# Patient Record
Sex: FEMALE | Race: WHITE | NOT HISPANIC OR LATINO | Employment: UNEMPLOYED | URBAN - NONMETROPOLITAN AREA
[De-identification: names, ages, dates, MRNs, and addresses within clinical notes are randomized per-mention and may not be internally consistent; named-entity substitution may affect disease eponyms.]

---
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
## 2023-04-03 ENCOUNTER — TELEPHONE (OUTPATIENT)
Dept: OBSTETRICS AND GYNECOLOGY | Facility: CLINIC | Age: 18
End: 2023-04-03
Payer: MEDICAID

## 2023-04-03 NOTE — TELEPHONE ENCOUNTER
Pt called in stating that she just moved back to [place].She is needing to get in to see someone ASAP. She is due April 11th and the soonest opening I seen was the 10th. She was also wondering if there was need for a ultra sound.

## 2023-04-04 ENCOUNTER — ANESTHESIA (OUTPATIENT)
Dept: LABOR AND DELIVERY | Facility: HOSPITAL | Age: 18
End: 2023-04-04
Payer: MEDICAID

## 2023-04-04 ENCOUNTER — ANESTHESIA EVENT (OUTPATIENT)
Dept: LABOR AND DELIVERY | Facility: HOSPITAL | Age: 18
End: 2023-04-04
Payer: MEDICAID

## 2023-04-04 ENCOUNTER — HOSPITAL ENCOUNTER (INPATIENT)
Facility: HOSPITAL | Age: 18
LOS: 3 days | Discharge: HOME | End: 2023-04-07
Attending: OBSTETRICS & GYNECOLOGY | Admitting: OBSTETRICS & GYNECOLOGY
Payer: MEDICAID

## 2023-04-04 ENCOUNTER — INITIAL PRENATAL (OUTPATIENT)
Dept: OBSTETRICS AND GYNECOLOGY | Facility: CLINIC | Age: 18
End: 2023-04-04
Payer: MEDICAID

## 2023-04-04 VITALS
SYSTOLIC BLOOD PRESSURE: 122 MMHG | WEIGHT: 218 LBS | DIASTOLIC BLOOD PRESSURE: 80 MMHG | HEIGHT: 66 IN | BODY MASS INDEX: 35.03 KG/M2

## 2023-04-04 DIAGNOSIS — O36.63X0 EXCESSIVE FETAL GROWTH AFFECTING MANAGEMENT OF PREGNANCY IN THIRD TRIMESTER, SINGLE OR UNSPECIFIED FETUS: ICD-10-CM

## 2023-04-04 DIAGNOSIS — Z3A.39 39 WEEKS GESTATION OF PREGNANCY: Primary | ICD-10-CM

## 2023-04-04 DIAGNOSIS — O28.3 ABNORMAL OBSTETRIC ULTRASOUND SCAN: ICD-10-CM

## 2023-04-04 LAB
ABO GROUP BLD: NORMAL
AMPHET+METHAMPHET UR QL: NEGATIVE
AMPHETAMINES UR QL: NEGATIVE
BARBITURATES UR QL SCN: NEGATIVE
BASOPHILS # BLD AUTO: 0.03 10*3/MM3 (ref 0–0.2)
BASOPHILS NFR BLD AUTO: 0.2 % (ref 0–1.5)
BENZODIAZ UR QL SCN: NEGATIVE
BLD GP AB SCN SERPL QL: NEGATIVE
BUPRENORPHINE SERPL-MCNC: NEGATIVE NG/ML
CANNABINOIDS SERPL QL: NEGATIVE
COCAINE UR QL: NEGATIVE
DEPRECATED RDW RBC AUTO: 51.7 FL (ref 37–54)
EOSINOPHIL # BLD AUTO: 0.08 10*3/MM3 (ref 0–0.4)
EOSINOPHIL NFR BLD AUTO: 0.5 % (ref 0.3–6.2)
ERYTHROCYTE [DISTWIDTH] IN BLOOD BY AUTOMATED COUNT: 16.7 % (ref 12.3–15.4)
GLUCOSE UR STRIP-MCNC: NEGATIVE MG/DL
HBV SURFACE AG SERPL QL IA: NORMAL
HCT VFR BLD AUTO: 30.1 % (ref 34–46.6)
HCV AB SER DONR QL: NORMAL
HGB BLD-MCNC: 8.9 G/DL (ref 12–15.9)
HIV1+2 AB SER QL: NORMAL
IMM GRANULOCYTES # BLD AUTO: 0.07 10*3/MM3 (ref 0–0.05)
IMM GRANULOCYTES NFR BLD AUTO: 0.5 % (ref 0–0.5)
LYMPHOCYTES # BLD AUTO: 3.6 10*3/MM3 (ref 0.7–3.1)
LYMPHOCYTES NFR BLD AUTO: 24.3 % (ref 19.6–45.3)
MCH RBC QN AUTO: 25.4 PG (ref 26.6–33)
MCHC RBC AUTO-ENTMCNC: 29.6 G/DL (ref 31.5–35.7)
MCV RBC AUTO: 86 FL (ref 79–97)
METHADONE UR QL SCN: NEGATIVE
MONOCYTES # BLD AUTO: 0.93 10*3/MM3 (ref 0.1–0.9)
MONOCYTES NFR BLD AUTO: 6.3 % (ref 5–12)
NEUTROPHILS NFR BLD AUTO: 10.09 10*3/MM3 (ref 1.7–7)
NEUTROPHILS NFR BLD AUTO: 68.2 % (ref 42.7–76)
NRBC BLD AUTO-RTO: 0 /100 WBC (ref 0–0.2)
OPIATES UR QL: NEGATIVE
OXYCODONE UR QL SCN: NEGATIVE
PCP UR QL SCN: NEGATIVE
PLATELET # BLD AUTO: 242 10*3/MM3 (ref 140–450)
PMV BLD AUTO: 12.5 FL (ref 6–12)
PROPOXYPH UR QL: NEGATIVE
PROT UR STRIP-MCNC: ABNORMAL MG/DL
RBC # BLD AUTO: 3.5 10*6/MM3 (ref 3.77–5.28)
RH BLD: POSITIVE
RPR SER QL: NORMAL
T&S EXPIRATION DATE: NORMAL
TRICYCLICS UR QL SCN: NEGATIVE
WBC NRBC COR # BLD: 14.8 10*3/MM3 (ref 3.4–10.8)

## 2023-04-04 PROCEDURE — 80081 OBSTETRIC PANEL INC HIV TSTG: CPT | Performed by: OBSTETRICS & GYNECOLOGY

## 2023-04-04 PROCEDURE — 3E033VJ INTRODUCTION OF OTHER HORMONE INTO PERIPHERAL VEIN, PERCUTANEOUS APPROACH: ICD-10-PCS | Performed by: OBSTETRICS & GYNECOLOGY

## 2023-04-04 PROCEDURE — 99204 OFFICE O/P NEW MOD 45 MIN: CPT | Performed by: OBSTETRICS & GYNECOLOGY

## 2023-04-04 PROCEDURE — 25010000002 PENICILLIN G POTASSIUM PER 600000 UNITS: Performed by: OBSTETRICS & GYNECOLOGY

## 2023-04-04 PROCEDURE — 25010000002 BUTORPHANOL PER 1 MG: Performed by: OBSTETRICS & GYNECOLOGY

## 2023-04-04 PROCEDURE — C1755 CATHETER, INTRASPINAL: HCPCS | Performed by: NURSE ANESTHETIST, CERTIFIED REGISTERED

## 2023-04-04 PROCEDURE — 86803 HEPATITIS C AB TEST: CPT | Performed by: OBSTETRICS & GYNECOLOGY

## 2023-04-04 PROCEDURE — 10907ZC DRAINAGE OF AMNIOTIC FLUID, THERAPEUTIC FROM PRODUCTS OF CONCEPTION, VIA NATURAL OR ARTIFICIAL OPENING: ICD-10-PCS | Performed by: OBSTETRICS & GYNECOLOGY

## 2023-04-04 PROCEDURE — 25010000002 ROPIVACAINE PER 1 MG: Performed by: NURSE ANESTHETIST, CERTIFIED REGISTERED

## 2023-04-04 PROCEDURE — 80306 DRUG TEST PRSMV INSTRMNT: CPT | Performed by: OBSTETRICS & GYNECOLOGY

## 2023-04-04 RX ORDER — SODIUM CHLORIDE 0.9 % (FLUSH) 0.9 %
10 SYRINGE (ML) INJECTION EVERY 12 HOURS SCHEDULED
Status: DISCONTINUED | OUTPATIENT
Start: 2023-04-04 | End: 2023-04-05 | Stop reason: HOSPADM

## 2023-04-04 RX ORDER — BUTORPHANOL TARTRATE 1 MG/ML
2 INJECTION, SOLUTION INTRAMUSCULAR; INTRAVENOUS
Status: DISCONTINUED | OUTPATIENT
Start: 2023-04-04 | End: 2023-04-05 | Stop reason: HOSPADM

## 2023-04-04 RX ORDER — HETASTARCH 6 G/100ML
500 INJECTION, SOLUTION INTRAVENOUS ONCE AS NEEDED
Status: DISCONTINUED | OUTPATIENT
Start: 2023-04-04 | End: 2023-04-05 | Stop reason: HOSPADM

## 2023-04-04 RX ORDER — MISOPROSTOL 200 UG/1
800 TABLET ORAL ONCE AS NEEDED
Status: DISCONTINUED | OUTPATIENT
Start: 2023-04-04 | End: 2023-04-05 | Stop reason: HOSPADM

## 2023-04-04 RX ORDER — TRISODIUM CITRATE DIHYDRATE AND CITRIC ACID MONOHYDRATE 500; 334 MG/5ML; MG/5ML
30 SOLUTION ORAL ONCE AS NEEDED
Status: DISCONTINUED | OUTPATIENT
Start: 2023-04-04 | End: 2023-04-05 | Stop reason: HOSPADM

## 2023-04-04 RX ORDER — MISOPROSTOL 200 UG/1
800 TABLET ORAL ONCE
Status: DISCONTINUED | OUTPATIENT
Start: 2023-04-04 | End: 2023-04-04

## 2023-04-04 RX ORDER — TRISODIUM CITRATE DIHYDRATE AND CITRIC ACID MONOHYDRATE 500; 334 MG/5ML; MG/5ML
30 SOLUTION ORAL ONCE
Status: DISCONTINUED | OUTPATIENT
Start: 2023-04-04 | End: 2023-04-05 | Stop reason: HOSPADM

## 2023-04-04 RX ORDER — OXYTOCIN/0.9 % SODIUM CHLORIDE 30/500 ML
999 PLASTIC BAG, INJECTION (ML) INTRAVENOUS ONCE
Status: DISCONTINUED | OUTPATIENT
Start: 2023-04-04 | End: 2023-04-04

## 2023-04-04 RX ORDER — OXYTOCIN/0.9 % SODIUM CHLORIDE 30/500 ML
2-30 PLASTIC BAG, INJECTION (ML) INTRAVENOUS
Status: DISCONTINUED | OUTPATIENT
Start: 2023-04-04 | End: 2023-04-05

## 2023-04-04 RX ORDER — ONDANSETRON 4 MG/1
4 TABLET, FILM COATED ORAL EVERY 6 HOURS PRN
Status: DISCONTINUED | OUTPATIENT
Start: 2023-04-04 | End: 2023-04-05 | Stop reason: HOSPADM

## 2023-04-04 RX ORDER — CARBOPROST TROMETHAMINE 250 UG/ML
250 INJECTION, SOLUTION INTRAMUSCULAR AS NEEDED
Status: DISCONTINUED | OUTPATIENT
Start: 2023-04-04 | End: 2023-04-05 | Stop reason: HOSPADM

## 2023-04-04 RX ORDER — METHYLERGONOVINE MALEATE 0.2 MG/ML
200 INJECTION INTRAVENOUS ONCE AS NEEDED
Status: DISCONTINUED | OUTPATIENT
Start: 2023-04-04 | End: 2023-04-05 | Stop reason: HOSPADM

## 2023-04-04 RX ORDER — ONDANSETRON 2 MG/ML
4 INJECTION INTRAMUSCULAR; INTRAVENOUS EVERY 6 HOURS PRN
Status: DISCONTINUED | OUTPATIENT
Start: 2023-04-04 | End: 2023-04-05 | Stop reason: HOSPADM

## 2023-04-04 RX ORDER — OXYTOCIN/0.9 % SODIUM CHLORIDE 30/500 ML
999 PLASTIC BAG, INJECTION (ML) INTRAVENOUS ONCE AS NEEDED
Status: DISCONTINUED | OUTPATIENT
Start: 2023-04-04 | End: 2023-04-05 | Stop reason: HOSPADM

## 2023-04-04 RX ORDER — BUTORPHANOL TARTRATE 1 MG/ML
1 INJECTION, SOLUTION INTRAMUSCULAR; INTRAVENOUS
Status: DISCONTINUED | OUTPATIENT
Start: 2023-04-04 | End: 2023-04-05 | Stop reason: HOSPADM

## 2023-04-04 RX ORDER — FAMOTIDINE 10 MG/ML
20 INJECTION, SOLUTION INTRAVENOUS ONCE AS NEEDED
OUTPATIENT
Start: 2023-04-04

## 2023-04-04 RX ORDER — OXYTOCIN/0.9 % SODIUM CHLORIDE 30/500 ML
250 PLASTIC BAG, INJECTION (ML) INTRAVENOUS CONTINUOUS
Status: DISCONTINUED | OUTPATIENT
Start: 2023-04-04 | End: 2023-04-04

## 2023-04-04 RX ORDER — ACETAMINOPHEN 325 MG/1
650 TABLET ORAL EVERY 4 HOURS PRN
Status: DISCONTINUED | OUTPATIENT
Start: 2023-04-04 | End: 2023-04-05 | Stop reason: HOSPADM

## 2023-04-04 RX ORDER — SODIUM CHLORIDE, SODIUM LACTATE, POTASSIUM CHLORIDE, CALCIUM CHLORIDE 600; 310; 30; 20 MG/100ML; MG/100ML; MG/100ML; MG/100ML
125 INJECTION, SOLUTION INTRAVENOUS CONTINUOUS
Status: DISCONTINUED | OUTPATIENT
Start: 2023-04-04 | End: 2023-04-05

## 2023-04-04 RX ORDER — OXYTOCIN/0.9 % SODIUM CHLORIDE 30/500 ML
250 PLASTIC BAG, INJECTION (ML) INTRAVENOUS ONCE AS NEEDED
Status: DISCONTINUED | OUTPATIENT
Start: 2023-04-06 | End: 2023-04-05 | Stop reason: HOSPADM

## 2023-04-04 RX ORDER — ROPIVACAINE HYDROCHLORIDE 2 MG/ML
INJECTION, SOLUTION EPIDURAL; INFILTRATION; PERINEURAL AS NEEDED
Status: DISCONTINUED | OUTPATIENT
Start: 2023-04-04 | End: 2023-04-05 | Stop reason: SURG

## 2023-04-04 RX ORDER — SODIUM CHLORIDE 0.9 % (FLUSH) 0.9 %
1-10 SYRINGE (ML) INJECTION AS NEEDED
Status: DISCONTINUED | OUTPATIENT
Start: 2023-04-04 | End: 2023-04-05 | Stop reason: HOSPADM

## 2023-04-04 RX ORDER — TERBUTALINE SULFATE 1 MG/ML
0.25 INJECTION, SOLUTION SUBCUTANEOUS AS NEEDED
Status: DISCONTINUED | OUTPATIENT
Start: 2023-04-04 | End: 2023-04-05 | Stop reason: HOSPADM

## 2023-04-04 RX ORDER — LIDOCAINE HYDROCHLORIDE 10 MG/ML
5 INJECTION, SOLUTION EPIDURAL; INFILTRATION; INTRACAUDAL; PERINEURAL AS NEEDED
Status: DISCONTINUED | OUTPATIENT
Start: 2023-04-04 | End: 2023-04-05 | Stop reason: HOSPADM

## 2023-04-04 RX ORDER — PRENATAL VIT NO.126/IRON/FOLIC 28MG-0.8MG
TABLET ORAL DAILY
COMMUNITY

## 2023-04-04 RX ORDER — EPHEDRINE SULFATE 50 MG/ML
10 INJECTION, SOLUTION INTRAVENOUS
Status: DISCONTINUED | OUTPATIENT
Start: 2023-04-04 | End: 2023-04-05 | Stop reason: HOSPADM

## 2023-04-04 RX ORDER — PENICILLIN G 3000000 [IU]/50ML
3 INJECTION, SOLUTION INTRAVENOUS EVERY 4 HOURS
Status: DISCONTINUED | OUTPATIENT
Start: 2023-04-04 | End: 2023-04-05

## 2023-04-04 RX ADMIN — SODIUM CHLORIDE, POTASSIUM CHLORIDE, SODIUM LACTATE AND CALCIUM CHLORIDE 125 ML/HR: 600; 310; 30; 20 INJECTION, SOLUTION INTRAVENOUS at 11:45

## 2023-04-04 RX ADMIN — SODIUM CHLORIDE, POTASSIUM CHLORIDE, SODIUM LACTATE AND CALCIUM CHLORIDE 125 ML/HR: 600; 310; 30; 20 INJECTION, SOLUTION INTRAVENOUS at 19:16

## 2023-04-04 RX ADMIN — ROPIVACAINE HYDROCHLORIDE 8 ML/HR: 2 INJECTION, SOLUTION EPIDURAL; INFILTRATION at 16:11

## 2023-04-04 RX ADMIN — ROPIVACAINE HYDROCHLORIDE 5 ML: 2 INJECTION, SOLUTION EPIDURAL; INFILTRATION at 16:11

## 2023-04-04 RX ADMIN — SODIUM CHLORIDE, POTASSIUM CHLORIDE, SODIUM LACTATE AND CALCIUM CHLORIDE 999 ML/HR: 600; 310; 30; 20 INJECTION, SOLUTION INTRAVENOUS at 16:00

## 2023-04-04 RX ADMIN — PENICILLIN G 3 MILLION UNITS: 3000000 INJECTION, SOLUTION INTRAVENOUS at 21:33

## 2023-04-04 RX ADMIN — PENICILLIN G 3 MILLION UNITS: 3000000 INJECTION, SOLUTION INTRAVENOUS at 17:34

## 2023-04-04 RX ADMIN — SODIUM CHLORIDE 5 MILLION UNITS: 900 INJECTION INTRAVENOUS at 12:42

## 2023-04-04 RX ADMIN — BUTORPHANOL TARTRATE 1 MG: 1 INJECTION, SOLUTION INTRAMUSCULAR; INTRAVENOUS at 14:36

## 2023-04-04 RX ADMIN — Medication 2 MILLI-UNITS/MIN: at 12:43

## 2023-04-04 NOTE — ANESTHESIA PREPROCEDURE EVALUATION
Anesthesia Evaluation     Patient summary reviewed and Nursing notes reviewed                Airway   Mallampati: I  TM distance: >3 FB  Neck ROM: full  No difficulty expected  Dental - normal exam     Pulmonary - negative pulmonary ROS and normal exam   Cardiovascular - negative cardio ROS and normal exam  Exercise tolerance: good (4-7 METS)        Neuro/Psych- negative ROS  GI/Hepatic/Renal/Endo - negative ROS     Musculoskeletal (-) negative ROS    Abdominal  - normal exam    Bowel sounds: normal.   Substance History - negative use     OB/GYN negative ob/gyn ROS         Other - negative ROS                       Anesthesia Plan    ASA 2     epidural       Anesthetic plan, risks, benefits, and alternatives have been provided, discussed and informed consent has been obtained with: patient.        CODE STATUS:    Code Status (Patient has no pulse and is not breathing): CPR (Attempt to Resuscitate)  Medical Interventions (Patient has pulse or is breathing): Full Support

## 2023-04-04 NOTE — PROGRESS NOTES
Efrain Ramey MD  [facility]  [address]  [place] [id number]  Office [phone]  Fax [phone]      [facility]  Sammi Jensen  : 2005  MRN: [id number]  CSN: [id number]    Labor progress note    Subjective   She reports is having no problems     Objective   Min/max vitals past 24 hours:  No data recorded   BP  Min: 122/80  Max: 122/80   No data recorded   No data recorded        FHT's: reactive, reassuring and category 1.  external monitors used   Cervix: was not checked.   Contractions: none      Assessment   1. IUP at 39w0d  2. Abnormal BPP  3. Late transfer of prenatal care  4. GBs unknown     Plan   Augment with pitocin  AROM - clear fluid seen  Allow labor to continue pending maternal and fetal status  Plan discussed with family and questions answered.  Understanding verbalized.    Efrain Ramey MD  2023  13:02 CDT

## 2023-04-04 NOTE — H&P
[facility]  HISTORY AND PHYSICAL, OBGYN    Patient Name: Sammi Jensen  : 2005  MRN: [id number]  Primary Care Physician:  Provider, No Known  Date of admission: 2023    Subjective   Subjective     Chief Complaint: No chief complaint on file.      HPI: Sammi Jensen is a 18 y.o. year old  with an Estimated Date of Delivery: 23 currently at 39w0d presenting with no complaints. She was seen for new OB visit this morning and BPP was 4/8. She was sent to L&D for NST/IOL at term. No complaints. No bleeding, abnormal discharge or leakage of fluid. Reports appropriate fetal movement.     It has been complicated by:  • Late transfer of prenatal care at 39 weeks  • Failed 1-hr GTT, passed 3-hr GTT  • GBS unknown    ROS:  All systems were reviewed and negative except for: appropriate fetal movement noted      Personal History     OB History    Para Term  AB Living   2 0 0 0 1 0   SAB IAB Ectopic Molar Multiple Live Births   1 0 0 0 0 0      # Outcome Date GA Lbr Arcadio/2nd Weight Sex Delivery Anes PTL Lv   2 Current            1 SAB 2022               No past medical history on file.    No past surgical history on file.    Family History: family history includes Ovarian cysts in her mother. Otherwise pertinent FHx was reviewed and not pertinent to current issue.    Social History:  reports that she has quit smoking. Her smoking use included cigarettes. She has never used smokeless tobacco. She reports that she does not drink alcohol and does not use drugs.    Home Medications:  prenatal vitamin    Allergies:  No Known Allergies    Objective   Objective     Vitals:   BP: (122)/(80) 122/80    Physical Exam     General: Well Developed, Well Nourished, not in acute distress   HEENT: normocephalic, atraumatic, conjunctiva non-icteric    Respiratory: non-labored, symmetrical chest rise   Gastrointestinal: gravid, soft, no TTP, no rebound tenderness or guarding to palpation, no  palpable ctx   Neurologic: alert and oriented, CN II-XII grossly intact without focal deficit  Psychiatric: normal mood, pleasant, cooperative  Musculoskeletal: negative calf tenderness, no lower extremity edema  Skin: warm & dry, no cyanosis, no jaundice    Pelvic Exam:  *Consent to pelvic exam obtained verbally from the patient. RN chaperone present during the exam.  Vagina: No lesions, normal physiologic appearing discharge, no pooling/bleeding or clots, cervix visually closed  Uterus: Appropriate for gestational age, nontender, no palpable contractions   Cervix: 250/-3    Imaging  EFW by recent prenatal US: 3531 g (58%, AC 60%) on 2023    Prenatal Labs  No results found for: HGB, RUBELLAABIGG, HEPBSAG, LABRPR, ABORH, ABSCRN, LABANTI, ABID, QGL5JLP9, HEPCVIRUSABY, GCT, GGTFASTING, ISY6XXBR, FZN6IIHF, TRS8KWRF, STREPGPB, URINECX, CHLAMNAA, NGONORRHON      Result Review    pending      Assessment & Plan   Assessment / Plan     Sammi Jensen is a 18 y.o. year old  with an Estimated Date of Delivery: 23 currently at 39w0d presenting with abnormal BPP.    1. Pregnancy, 39w0d  2. Abnormal BPP  3. Late transfer of prenatal care  4. GBS unknown    PLAN  -admit to L&D  -Prenatal labs  -PCN for GBS prophylaxis  -pitocin per protocol  -AROM PRN  -epidural at patient request  anctripp Ramey MD  2023  10:41 CDT

## 2023-04-04 NOTE — ANESTHESIA PROCEDURE NOTES
Labor Epidural      Patient reassessed immediately prior to procedure    Patient location during procedure: OB  Indication:at surgeon's request  Performed By  CRNA/SCOTT: Daron Boss CRNA  Preanesthetic Checklist  Completed: patient identified, IV checked, site marked, risks and benefits discussed, surgical consent, monitors and equipment checked, pre-op evaluation and timeout performed  Prep:  Pt Position:sitting  Sterile Tech:cap, gloves, mask and sterile barrier  Prep:DuraPrep  Monitoring:blood pressure monitoring and continuous pulse oximetry  Epidural Block Procedure:  Approach:midline  Guidance:palpation technique  Location:L3-L4  Needle Type:Tuohy  Needle Gauge:18 G  Loss of Resistance Medium: saline  Loss of Resistance: 7cm  Cath Depth at skin:12 cm  Paresthesia: none  Aspiration:negative  Test Dose:negative  Number of Attempts: 1  Post Assessment:  Dressing:occlusive dressing applied and secured with tape  Pt Tolerance:patient tolerated the procedure well with no apparent complications  Complications:no

## 2023-04-04 NOTE — PROGRESS NOTES
[facility]   HISTORY AND PHYSICAL  Subjective   Subjective     Chief Complaint:   Chief Complaint   Patient presents with   • Initial Prenatal Visit     Patient here to establish care after moving to the area. Patient is 39 weeks today. Patient denies contractions, leakage of fluid, vaginal bleeding. Patient reports good fetal movement. Patient getting ultrasound today after visit. Patient had 1 hr- failed, did 3 hour and passed. Patient had normal anatomy on 20 week ultrasound per patient. Patient states her BPs have all been normal.        History of Present Illness  Sammi Jensen is a 18 y.o. female  who presents for New OB. Late transfer of care. Moved to the area from [place]. States had prenatal care this pregnancy. Found out she was pregnant in 2022. Recent miscarriage in May 2022. AT that time, states she was a few months pregnant. EMILY 2023. States failed 1-hr GTT but passed 3-hr GTT. Unsure of blood type, denies RhoGam or other injections this pregnancy including Tdap. Denies GBS swab being performed. Reports normal anatomy.  Endorses appropriate fetal movement. Denies contractions, leakage of fluid or vaginal bleeding.     Review of Systems   Genitourinary: Negative for decreased urine volume, difficulty urinating, dyspareunia, dysuria, enuresis, flank pain, frequency, genital sores, hematuria, menstrual problem, pelvic pain, urgency, vaginal bleeding, vaginal discharge and vaginal pain.   All other systems reviewed and are negative.       Personal History     OB History    Para Term  AB Living   2 0 0 0 1 0   SAB IAB Ectopic Molar Multiple Live Births   1 0 0 0 0 0      # Outcome Date GA Lbr Arcadio/2nd Weight Sex Delivery Anes PTL Lv   2 Current            1 SAB 2022             History reviewed. No pertinent past medical history.  History reviewed. No pertinent surgical history.    Home Medications:  prenatal vitamin    Allergies:  She has No Known  Allergies.    Objective    Objective     Vitals:   BP: (122)/(80) 122/80    Physical Exam  Vitals and nursing note reviewed. Exam conducted with a chaperone present.   Constitutional:       General: She is not in acute distress.     Appearance: Normal appearance. She is not ill-appearing.   HENT:      Head: Normocephalic and atraumatic.      Nose: No congestion or rhinorrhea.   Eyes:      General: No scleral icterus.        Right eye: No discharge.         Left eye: No discharge.      Extraocular Movements: Extraocular movements intact.      Conjunctiva/sclera: Conjunctivae normal.   Pulmonary:      Effort: Pulmonary effort is normal. No accessory muscle usage or respiratory distress.   Abdominal:      Palpations: Abdomen is soft.       Genitourinary:     General: Normal vulva.   Musculoskeletal:      Right lower leg: No edema.      Left lower leg: No edema.   Skin:     General: Skin is warm and dry.      Coloration: Skin is not ashen, cyanotic or jaundiced.   Neurological:      General: No focal deficit present.      Mental Status: She is alert and oriented to person, place, and time.   Psychiatric:         Mood and Affect: Mood normal.         Behavior: Behavior is cooperative.         Result Review    POC Urinalysis Dipstick (04/04/2023 00:00)    US Fetal Biophysical Profile;Without Non-Stress Testing (04/04/2023 09:51)  US Ob Follow Up Transabdominal Approach (04/04/2023 09:51)  US today:  Vertex, posterior placenta, PETAR 20.9cm (DVP 7.3cm), BPP 4/8 (-2 breathing, -2 gross body movement), EFW 3531g (58% - AC 60%)      Assessment & Plan   Assessment / Plan     Diagnoses and all orders for this visit:    1. 39 weeks gestation of pregnancy (Primary)  -     Cancel: Chlamydia trachomatis, Neisseria gonorrhoeae, PCR w/ confirmation - Urine, Urine, Clean Catch  -     ABO / Rh  -     Antibody Screen  -     CBC & Differential  -     Hepatitis C Antibody  -     Hepatitis B Surface Antigen  -     Hemoglobinopathy  Fractionation Cascade  -     HIV-1 / O / 2 Ag / Antibody 4th Generation  -     RPR  -     Rubella Antibody, IgG  -     Cancel: ToxASSURE Select 13 (MW) - Urine, Clean Catch  -     Varicella Zoster Antibody, IgG  -     Cancel: Urine Culture - Urine, Urine, Clean Catch  -     Cancel: Strep B Screen - Swab, Vaginal/Rectum  -     Strep B Screen - Swab, Vaginal/Rectum  -     Cancel: Chlamydia trachomatis, Neisseria gonorrhoeae, PCR w/ confirmation - Swab, Vagina  -     Urine Culture - Urine, Urine, Clean Catch  -     ToxASSURE Select 13 (MW) - Urine, Clean Catch  -     Chlamydia trachomatis, Neisseria gonorrhoeae, PCR w/ confirmation - Swab, Vagina    2. Excessive fetal growth affecting management of pregnancy in third trimester, single or unspecified fetus    3. Abnormal obstetric ultrasound scan        Discussion:   To L&D for NST and induction at 39 weeks secondary to abnormal BPP.     Return in about 1 week (around 4/11/2023) for OB visit.    Efrain Ramey MD

## 2023-04-05 PROBLEM — O28.3 ABNORMAL OBSTETRIC ULTRASOUND SCAN: Status: RESOLVED | Noted: 2023-04-04 | Resolved: 2023-04-05

## 2023-04-05 LAB
ABO GROUP BLD: NORMAL
BASOPHILS # BLD AUTO: 0 X10E3/UL (ref 0–0.2)
BASOPHILS NFR BLD AUTO: 0 %
BLD GP AB SCN SERPL QL: NEGATIVE
EOSINOPHIL # BLD AUTO: 0.1 X10E3/UL (ref 0–0.4)
EOSINOPHIL NFR BLD AUTO: 1 %
ERYTHROCYTE [DISTWIDTH] IN BLOOD BY AUTOMATED COUNT: 15.8 % (ref 11.7–15.4)
HBV SURFACE AG SERPL QL IA: NEGATIVE
HCT VFR BLD AUTO: 30.1 % (ref 34–46.6)
HCV IGG SERPL QL IA: NON REACTIVE
HGB A MFR BLD ELPH: 97.8 % (ref 96.4–98.8)
HGB A2 MFR BLD ELPH: 2.2 % (ref 1.8–3.2)
HGB BLD-MCNC: 9.3 G/DL (ref 11.1–15.9)
HGB F MFR BLD ELPH: 0 % (ref 0–2)
HGB FRACT BLD-IMP: NORMAL
HGB S MFR BLD ELPH: 0 %
HIV 1+2 AB+HIV1 P24 AG SERPL QL IA: NON REACTIVE
IMM GRANULOCYTES # BLD AUTO: 0.1 X10E3/UL (ref 0–0.1)
IMM GRANULOCYTES NFR BLD AUTO: 1 %
LYMPHOCYTES # BLD AUTO: 4 X10E3/UL (ref 0.7–3.1)
LYMPHOCYTES NFR BLD AUTO: 27 %
MCH RBC QN AUTO: 25.8 PG (ref 26.6–33)
MCHC RBC AUTO-ENTMCNC: 30.9 G/DL (ref 31.5–35.7)
MCV RBC AUTO: 83 FL (ref 79–97)
MONOCYTES # BLD AUTO: 1 X10E3/UL (ref 0.1–0.9)
MONOCYTES NFR BLD AUTO: 7 %
NEUTROPHILS # BLD AUTO: 9.6 X10E3/UL (ref 1.4–7)
NEUTROPHILS NFR BLD AUTO: 64 %
PLATELET # BLD AUTO: 270 X10E3/UL (ref 150–450)
RBC # BLD AUTO: 3.61 X10E6/UL (ref 3.77–5.28)
RH BLD: POSITIVE
RPR SER QL: NON REACTIVE
RUBV IGG SERPL IA-ACNC: 1.73 INDEX
RUBV IGG SERPL IA-ACNC: 1.8 INDEX
VZV IGG SER IA-ACNC: 276 INDEX
WBC # BLD AUTO: 14.8 X10E3/UL (ref 3.4–10.8)

## 2023-04-05 PROCEDURE — 25010000002 ROPIVACAINE PER 1 MG: Performed by: NURSE ANESTHETIST, CERTIFIED REGISTERED

## 2023-04-05 PROCEDURE — 59409 OBSTETRICAL CARE: CPT | Performed by: OBSTETRICS & GYNECOLOGY

## 2023-04-05 PROCEDURE — 88307 TISSUE EXAM BY PATHOLOGIST: CPT | Performed by: OBSTETRICS & GYNECOLOGY

## 2023-04-05 PROCEDURE — 0HQ9XZZ REPAIR PERINEUM SKIN, EXTERNAL APPROACH: ICD-10-PCS | Performed by: OBSTETRICS & GYNECOLOGY

## 2023-04-05 RX ORDER — ONDANSETRON 4 MG/1
4 TABLET, FILM COATED ORAL EVERY 6 HOURS PRN
Status: DISCONTINUED | OUTPATIENT
Start: 2023-04-05 | End: 2023-04-05 | Stop reason: HOSPADM

## 2023-04-05 RX ORDER — IBUPROFEN 600 MG/1
600 TABLET ORAL EVERY 6 HOURS PRN
Status: DISCONTINUED | OUTPATIENT
Start: 2023-04-05 | End: 2023-04-07 | Stop reason: HOSPADM

## 2023-04-05 RX ORDER — CALCIUM CARBONATE 200(500)MG
2 TABLET,CHEWABLE ORAL 3 TIMES DAILY PRN
Status: DISCONTINUED | OUTPATIENT
Start: 2023-04-05 | End: 2023-04-07 | Stop reason: HOSPADM

## 2023-04-05 RX ORDER — ACETAMINOPHEN 325 MG/1
650 TABLET ORAL EVERY 6 HOURS PRN
Status: DISCONTINUED | OUTPATIENT
Start: 2023-04-05 | End: 2023-04-07 | Stop reason: HOSPADM

## 2023-04-05 RX ORDER — ONDANSETRON 2 MG/ML
4 INJECTION INTRAMUSCULAR; INTRAVENOUS EVERY 6 HOURS PRN
Status: DISCONTINUED | OUTPATIENT
Start: 2023-04-05 | End: 2023-04-05 | Stop reason: HOSPADM

## 2023-04-05 RX ORDER — BISACODYL 10 MG
10 SUPPOSITORY, RECTAL RECTAL DAILY PRN
Status: DISCONTINUED | OUTPATIENT
Start: 2023-04-06 | End: 2023-04-07 | Stop reason: HOSPADM

## 2023-04-05 RX ORDER — CARBOPROST TROMETHAMINE 250 UG/ML
250 INJECTION, SOLUTION INTRAMUSCULAR ONCE
Status: DISCONTINUED | OUTPATIENT
Start: 2023-04-05 | End: 2023-04-07 | Stop reason: HOSPADM

## 2023-04-05 RX ORDER — PROMETHAZINE HYDROCHLORIDE 25 MG/1
25 TABLET ORAL EVERY 6 HOURS PRN
Status: DISCONTINUED | OUTPATIENT
Start: 2023-04-05 | End: 2023-04-07 | Stop reason: HOSPADM

## 2023-04-05 RX ORDER — HYDROCODONE BITARTRATE AND ACETAMINOPHEN 5; 325 MG/1; MG/1
1 TABLET ORAL EVERY 4 HOURS PRN
Status: DISCONTINUED | OUTPATIENT
Start: 2023-04-05 | End: 2023-04-07 | Stop reason: HOSPADM

## 2023-04-05 RX ORDER — SODIUM CHLORIDE 0.9 % (FLUSH) 0.9 %
1-10 SYRINGE (ML) INJECTION AS NEEDED
Status: DISCONTINUED | OUTPATIENT
Start: 2023-04-05 | End: 2023-04-07 | Stop reason: HOSPADM

## 2023-04-05 RX ORDER — IBUPROFEN 600 MG/1
600 TABLET ORAL EVERY 6 HOURS PRN
Status: DISCONTINUED | OUTPATIENT
Start: 2023-04-05 | End: 2023-04-05 | Stop reason: HOSPADM

## 2023-04-05 RX ORDER — DOCUSATE SODIUM 100 MG/1
100 CAPSULE, LIQUID FILLED ORAL 2 TIMES DAILY
Status: DISCONTINUED | OUTPATIENT
Start: 2023-04-05 | End: 2023-04-07 | Stop reason: HOSPADM

## 2023-04-05 RX ORDER — CALCIUM CARBONATE 200(500)MG
2 TABLET,CHEWABLE ORAL 3 TIMES DAILY PRN
Status: DISCONTINUED | OUTPATIENT
Start: 2023-04-05 | End: 2023-04-05 | Stop reason: HOSPADM

## 2023-04-05 RX ORDER — ONDANSETRON 4 MG/1
4 TABLET, FILM COATED ORAL EVERY 8 HOURS PRN
Status: DISCONTINUED | OUTPATIENT
Start: 2023-04-05 | End: 2023-04-07 | Stop reason: HOSPADM

## 2023-04-05 RX ORDER — NALOXONE HCL 0.4 MG/ML
0.4 VIAL (ML) INJECTION
Status: DISCONTINUED | OUTPATIENT
Start: 2023-04-05 | End: 2023-04-07 | Stop reason: HOSPADM

## 2023-04-05 RX ORDER — LIDOCAINE HYDROCHLORIDE 20 MG/ML
20 INJECTION, SOLUTION EPIDURAL; INFILTRATION; INTRACAUDAL; PERINEURAL ONCE
Status: COMPLETED | OUTPATIENT
Start: 2023-04-05 | End: 2023-04-05

## 2023-04-05 RX ORDER — MISOPROSTOL 200 UG/1
600 TABLET ORAL ONCE
Status: DISCONTINUED | OUTPATIENT
Start: 2023-04-05 | End: 2023-04-07 | Stop reason: HOSPADM

## 2023-04-05 RX ORDER — HYDROCORTISONE 25 MG/G
1 CREAM TOPICAL AS NEEDED
Status: DISCONTINUED | OUTPATIENT
Start: 2023-04-05 | End: 2023-04-07 | Stop reason: HOSPADM

## 2023-04-05 RX ORDER — MORPHINE SULFATE 2 MG/ML
1 INJECTION, SOLUTION INTRAMUSCULAR; INTRAVENOUS EVERY 4 HOURS PRN
Status: DISCONTINUED | OUTPATIENT
Start: 2023-04-05 | End: 2023-04-07 | Stop reason: HOSPADM

## 2023-04-05 RX ORDER — PROMETHAZINE HYDROCHLORIDE 12.5 MG/1
12.5 SUPPOSITORY RECTAL EVERY 6 HOURS PRN
Status: DISCONTINUED | OUTPATIENT
Start: 2023-04-05 | End: 2023-04-07 | Stop reason: HOSPADM

## 2023-04-05 RX ORDER — LIDOCAINE HYDROCHLORIDE 20 MG/ML
20 INJECTION, SOLUTION INFILTRATION; PERINEURAL ONCE AS NEEDED
Status: DISCONTINUED | OUTPATIENT
Start: 2023-04-05 | End: 2023-04-05

## 2023-04-05 RX ORDER — METHYLERGONOVINE MALEATE 0.2 MG/ML
200 INJECTION INTRAVENOUS ONCE
Status: DISCONTINUED | OUTPATIENT
Start: 2023-04-05 | End: 2023-04-07 | Stop reason: HOSPADM

## 2023-04-05 RX ORDER — ONDANSETRON 2 MG/ML
4 INJECTION INTRAMUSCULAR; INTRAVENOUS EVERY 6 HOURS PRN
Status: DISCONTINUED | OUTPATIENT
Start: 2023-04-05 | End: 2023-04-07 | Stop reason: HOSPADM

## 2023-04-05 RX ORDER — HYDROCODONE BITARTRATE AND ACETAMINOPHEN 10; 325 MG/1; MG/1
1 TABLET ORAL EVERY 4 HOURS PRN
Status: DISCONTINUED | OUTPATIENT
Start: 2023-04-05 | End: 2023-04-07 | Stop reason: HOSPADM

## 2023-04-05 RX ADMIN — IBUPROFEN 600 MG: 600 TABLET, FILM COATED ORAL at 04:05

## 2023-04-05 RX ADMIN — DOCUSATE SODIUM 100 MG: 100 CAPSULE ORAL at 08:21

## 2023-04-05 RX ADMIN — DOCUSATE SODIUM 100 MG: 100 CAPSULE ORAL at 19:51

## 2023-04-05 RX ADMIN — Medication: at 06:34

## 2023-04-05 RX ADMIN — Medication 999 ML/HR: at 04:07

## 2023-04-05 RX ADMIN — ROPIVACAINE HYDROCHLORIDE 8 ML: 2 INJECTION, SOLUTION EPIDURAL; INFILTRATION at 01:45

## 2023-04-05 RX ADMIN — LIDOCAINE HYDROCHLORIDE 10 ML: 20 INJECTION, SOLUTION EPIDURAL; INFILTRATION; INTRACAUDAL; PERINEURAL at 03:37

## 2023-04-05 RX ADMIN — IBUPROFEN 600 MG: 600 TABLET, FILM COATED ORAL at 19:51

## 2023-04-05 RX ADMIN — Medication 999 ML/HR: at 00:34

## 2023-04-05 NOTE — LACTATION NOTE
Mother's Name: Sammi     Phone #: [phone]  Infant Name: Giovany  : 23  Gestation: 39w1d  Day of life:  Birth weight: 7-3 (3260g) Discharge weight:  Weight Loss:   24 hour Summary of Feeds: 3 BFs  Voids: 1  Stools: 2  Assistive devices (shields, shells, etc):  Significant Maternal history:    Maternal Concerns: None at this time   Maternal Goal: Unsure  Mother's Medications:  PNV  Breastpump for home:  No  Ped follow up appt:    Called to room to assist with feeding. Patient attempting to latch infant to the left breast upon visit. With permission, assisted with positioning, hand expressing, and deep latching. Colostrum easily expresses. Infant latched well with wide gape and flanged lips. Deep jaw drops with audible swallows noted. Intermittent stimulation required. Patient had much difficulty keeping infant close to maintain deep latch, breaking infant's latch continuously. Patient required full assistance. Discussed waking techniques, hand expressing, proper positioning, shaping breast to aid with deep latching, signs of nutritive sucking, expected infant weight loss, output, and feeding plan. Recommended frequent hand expressing and skin to skin. Encouragement and support provided. Questions denied.     1440  Assisted with latch in football hold on the left breast. Infant gaped widely and latched deeply but continuously pulled away from breast. Was able to hold infant to maintain deep latch. Sucking with intermittent swallowing noted for 10 minutes. Assisted with latch in football hold on right breast. Patient was able to help more in getting infant latched. SO able to help stimulate infant. Deep jaw drops and swallowing observed. Instructed to call if further assistance needed.      1855  Called to room to assist. Infant asleep on patient's chest. Patient and SO both report attempting to wake/latch infant for the past hour. Recommended they allow him to rest at this time to conserve energy.  Advised patient to keep infant skin to skin, hand express, and finger feed drops of colostrum. Watch cues and latch at first cue or attempt again in 30 minutes to 1 hour. Hospital pump set up along with supplies. Education provided regarding use of pump, cleaning of pump parts, and flange size/fit. Recommended patient pump for 15 minutes if unable to wake infant to breastfeed. Feed infant all colostrum collected. Understanding verbalized. Questions denied.    Instructed mom our lactation team is here for continued support throughout their breastfeeding journey. Our team has encouraged mom to call with any questions or concerns that may arise after discharge.

## 2023-04-05 NOTE — ANESTHESIA POSTPROCEDURE EVALUATION
"Patient: Sammi Jensen    Procedure Summary     Date: 04/04/23 Room / Location:     Anesthesia Start: 1606 Anesthesia Stop: 04/05/23 0331    Procedure: LABOR ANALGESIA Diagnosis:     Scheduled Providers:  Provider: Daron Boss CRNA    Anesthesia Type: epidural ASA Status: 2          Anesthesia Type: epidural    Vitals  Vitals Value Taken Time   /75 04/05/23 1335   Temp 97.8 °F (36.6 °C) 04/05/23 1335   Pulse 94 04/05/23 1335   Resp 18 04/05/23 1335   SpO2 98 % 04/05/23 1335           Post Anesthesia Care and Evaluation    Patient location during evaluation: floor  Patient participation: complete - patient participated  Level of consciousness: awake and alert  Pain management: satisfactory to patient    Airway patency: patent  Anesthetic complications: No anesthetic complications  PONV Status: none  Cardiovascular status: acceptable  Respiratory status: acceptable  Hydration status: acceptable  Post Neuraxial Block status: Motor and sensory function returned to baseline and No signs or symptoms of PDPH  Comments: Blood pressure 128/75, pulse 94, temperature 97.8 °F (36.6 °C), temperature source Temporal, resp. rate 18, height 167.6 cm (66\"), weight 98.9 kg (218 lb), SpO2 98 %, currently breastfeeding.        "

## 2023-04-05 NOTE — LACTATION NOTE
Mother's Name: Sammi    Phone #:  [phone]  Infant Name: Giovany  :  23  Gestation:  39w 1d  Day of life:  Birth weight:   7-3 (3260 gm)  Discharge weight:  Weight Loss:   24 hour Summary of Feeds:  Voids:  Stools:  Assistive devices (shields, shells, etc):  Significant Maternal history:  ,   Maternal Concerns:    Maternal Goal:  Unsure  Mother's Medications:  PNV  Breastpump for home:  No  Ped follow up appt:    Called to room per nursing to initiate breastfeeding.  Mom holding infant skin to skin. Infant rooting on hands.  With mom's permission assisted to place infant in cradle position and latch.  Demonstrated hand expression with large drops that run down present.  Infant latched with deep latch and and sucked regularly with frequent swallows.  Infant remained latched for 25 minutes.  Then assisted to latch on left breast.  Infant continued to nurse after I left room.  Gave and reviewed breastfeeding handouts and book.  Discussed benefits of skin to skin, infant stomach size, on demand feeding and offering breast at least every 3 hours, breast compression and massage while feeding, hunger cues, and signs of adequate feeding.  Mom noted to have small marble size hard nodule located at 11:00 on right breast.  Patient denies pain with it.  Nodule is moveable.  Notified patient and nursing.  Patient stated she hadn't noticed this before.  Offered support as needed with breastfeeding. Lactation number written on handouts.  Encouraged patient to watch You Tube videos 1-3 on handouts.      Instructed mom our lactation team is here for continued support throughout their breastfeeding journey. Our team has encouraged mom to call with any questions or concerns that may arise after discharge.     Patient/Caregiver provided printed discharge information.

## 2023-04-05 NOTE — PLAN OF CARE
Problem: Adult Inpatient Plan of Care  Goal: Plan of Care Review  Outcome: Ongoing, Progressing  Flowsheets (Taken 4/5/2023 1550)  Progress: improving  Plan of Care Reviewed With: patient  Outcome Evaluation: VSS WNL, fundus fml uu scant lochia, voiding.  ambulating room   Goal Outcome Evaluation:  Plan of Care Reviewed With: patient        Progress: improving  Outcome Evaluation: VSS WNL, fundus fml uu scant lochia, voiding.  ambulating room

## 2023-04-05 NOTE — PLAN OF CARE
Goal Outcome Evaluation:  Plan of Care Reviewed With: patient        Progress: improving  Outcome Evaluation: patient being sent to PP unit with baby boy. breast feeding. uterus at the umbilicus. scant bleeding.

## 2023-04-05 NOTE — L&D DELIVERY NOTE
[facility]   Vaginal Delivery Note    Patient Name: Sammi Jensen  : 2005  MRN: [id number]    Date of Delivery: 2023     Diagnosis     Pre & Post-Delivery:  Intrauterine pregnancy at 39w1d  Labor status: Induced Onset of Labor     Abnormal obstetric ultrasound scan             Problem List    Transfer to Postpartum     Review the Delivery Report for details.     Delivery     Delivery: Vaginal, Spontaneous     YOB: 2025    Time of Birth:  Gestational Age 3:31 AM   39w1d     Anesthesia: Epidural     Delivering clinician: Ev Barth    Forceps?   No   Vacuum? No    Shoulder dystocia present: No        Delivery narrative:  Patient pushed for just over an hour, after getting epidural re-dosed so she was able to push effectively.  Patient delivered a viable male infant, in OA presentation.  Cord was immediately clamped and cut so baby could be evaluated in warmer, due to poor tone.  Spontaneous placenta, grossly intact.  1st degree MLL repaired with lidocaine and 3-0 vicryl.  Sponge and needle count confirmed with RN.      Infant     Findings: male  infant     Infant observations: Weight: 3260 g (7 lb 3 oz)   Length: 20.5  in  Observations/Comments:  HC 34cm AGA      Apgars: 8  @ 1 minute /    9  @ 5 minutes   Infant Name: Giovany     Placenta & Cord         Placenta delivered  Spontaneous  at   2023  3:35 AM     Cord: 3 vessels  present.   Nuchal Cord?  no   Cord blood obtained: No    Cord gases obtained:  Yes    Cord gas results: Venous:  No results found for: PHCVEN    Arterial:  No results found for: PHCART     Repair      Episiotomy: None     No    Lacerations: Yes  Laceration Information  Laceration Repaired?   Perineal: 1st  Yes    Periurethral:       Labial:       Sulcus:       Vaginal:       Cervical:         Suture used for repair: 3-0 Vicryl   Estimated Blood Loss:  minimal     Quantitative Blood Loss:          Complications     none    Disposition     Mother to Mother  Baby/Postpartum  in stable condition currently.  Baby to remains with mom  in stable condition currently.    Ev Barth MD  04/05/23  04:00 CDT

## 2023-04-05 NOTE — PLAN OF CARE
Goal Outcome Evaluation:  Plan of Care Reviewed With: patient, mother           Outcome Evaluation: patient delivered baby boy at 0331. 1st degree laceration repaired. APGARS 8 & 9

## 2023-04-05 NOTE — PAYOR COMM NOTE
"Macey Jensen (18 y.o. Female)     Date of Birth   2005    Social Security Number       [address] [address]    Home Phone   [phone]    MRN   [id number]       [facility]   Patient Refused    Marital Status   Single                            Admission Date   23    Admission Type   Elective    Admitting Provider   Efrain Ramey MD    Attending Provider   Efrain Ramey MD    Department, Room/Bed   [facility] MOTHER BABY 2A, M222/1       Discharge Date       Discharge Disposition       Discharge Destination                               Attending Provider: Efrain Ramey MD    Allergies: No Known Allergies    Isolation: None   Infection: None   Code Status: CPR    Ht: 167.6 cm (66\")   Wt: 98.9 kg (218 lb)    Admission Cmt: None   Principal Problem: Abnormal obstetric ultrasound scan [O28.3]                 Active Insurance as of 2023     Primary Coverage     Payor Plan Insurance Group Employer/Plan Group    ANTHEM MEDICAID ANTHEM MEDICAID [id number]     Payor Plan Address Payor Plan Phone Number Payor Plan Fax Number Effective Dates    [address] [address] [phone]  3/1/2022 - None Entered    [facility] [id number]       Subscriber Name Subscriber Birth Date Member ID       MACEY JENSEN 2005 [id number]                 Emergency Contacts      (Rel.) Home Phone Work Phone Mobile Phone    NORMA VASQUEZ (Mother) -- -- [phone]               History & Physical      Efrain Ramey MD at 23 1041           [facility]  HISTORY AND PHYSICAL, OBGYN    Patient Name: Macey Jensen  : 2005  MRN: [id number]  Primary Care Physician:  Provider, No Known  Date of admission: 2023    Subjective    Subjective     Chief Complaint: No chief complaint on file.    HPI: Macey Jensen is a 18 y.o. year old  with an Estimated Date of Delivery: 23 currently at 39w0d presenting with no complaints. She " was seen for new OB visit this morning and BPP was 4/8. She was sent to L&D for NST/IOL at term. No complaints. No bleeding, abnormal discharge or leakage of fluid. Reports appropriate fetal movement.     It has been complicated by:  • Late transfer of prenatal care at 39 weeks  • Failed 1-hr GTT, passed 3-hr GTT  • GBS unknown    ROS:  All systems were reviewed and negative except for: appropriate fetal movement noted      Personal History     OB History    Para Term  AB Living   2 0 0 0 1 0   SAB IAB Ectopic Molar Multiple Live Births   1 0 0 0 0 0      # Outcome Date GA Lbr Arcadio/2nd Weight Sex Delivery Anes PTL Lv   2 Current            1 SAB 2022               No past medical history on file.    No past surgical history on file.    Family History: family history includes Ovarian cysts in her mother. Otherwise pertinent FHx was reviewed and not pertinent to current issue.    Social History:  reports that she has quit smoking. Her smoking use included cigarettes. She has never used smokeless tobacco. She reports that she does not drink alcohol and does not use drugs.    Home Medications:  prenatal vitamin    Allergies:  No Known Allergies    Objective    Objective     Vitals:   BP: (122)/(80) 122/80    Physical Exam     General: Well Developed, Well Nourished, not in acute distress   HEENT: normocephalic, atraumatic, conjunctiva non-icteric    Respiratory: non-labored, symmetrical chest rise   Gastrointestinal: gravid, soft, no TTP, no rebound tenderness or guarding to palpation, no palpable ctx   Neurologic: alert and oriented, CN II-XII grossly intact without focal deficit  Psychiatric: normal mood, pleasant, cooperative  Musculoskeletal: negative calf tenderness, no lower extremity edema  Skin: warm & dry, no cyanosis, no jaundice    Pelvic Exam:  *Consent to pelvic exam obtained verbally from the patient. RN chaperone present during the exam.  Vagina: No lesions, normal physiologic appearing  discharge, no pooling/bleeding or clots, cervix visually closed  Uterus: Appropriate for gestational age, nontender, no palpable contractions   Cervix: 50/-3    Imaging  EFW by recent prenatal US: 3531 g (58%, AC 60%) on 2023    Prenatal Labs  No results found for: HGB, RUBELLAABIGG, HEPBSAG, LABRPR, ABORH, ABSCRN, LABANTI, ABID, XYJ1HQG0, HEPCVIRUSABY, GCT, GGTFASTING, IYM1BQBC, KBH3PVKJ, DIU6XLUC, STREPGPB, URINECX, CHLAMNAA, NGONORRHON    Result Review    pending    Assessment & Plan   Assessment / Plan     Sammi Jensen is a 18 y.o. year old  with an Estimated Date of Delivery: 23 currently at 39w0d presenting with abnormal BPP.    1. Pregnancy, 39w0d  2. Abnormal BPP  3. Late transfer of prenatal care  4. GBS unknown    PLAN  -admit to L&D  -Prenatal labs  -PCN for GBS prophylaxis  -pitocin per protocol  -AROM PRN  -epidural at patient request  ancipitate     Efrain Ramey MD  2023  10:41 CDT      Electronically signed by Efrain Ramey MD at 23 1044          Operative/Procedure Notes       Ev Barth MD at 23 0400  Version 1 of 1          [facility]   Vaginal Delivery Note    Patient Name: Sammi Jensen  : 2005  MRN: [id number]    Date of Delivery: 2023     Diagnosis     Pre & Post-Delivery:  Intrauterine pregnancy at 39w1d  Labor status: Induced Onset of Labor     Abnormal obstetric ultrasound scan             Delivery     Delivery: Vaginal, Spontaneous     YOB: 2025    Time of Birth:  Gestational Age 3:31 AM   39w1d     Anesthesia: Epidural     Delivering clinician: Ev Barth    Forceps?   No   Vacuum? No    Shoulder dystocia present: No        Delivery narrative:  Patient pushed for just over an hour, after getting epidural re-dosed so she was able to push effectively.  Patient delivered a viable male infant, in OA presentation.  Cord was immediately clamped and cut so baby could be evaluated in warmer, due to poor tone.   Spontaneous placenta, grossly intact.  1st degree MLL repaired with lidocaine and 3-0 vicryl.  Sponge and needle count confirmed with RN.      Infant     Findings: male  infant     Infant observations: Weight: 3260 g (7 lb 3 oz)   Length: 20.5  in  Observations/Comments:  HC 34cm AGA      Apgars: 8  @ 1 minute /    9  @ 5 minutes   Infant Name: Giovany     Placenta & Cord         Placenta delivered  Spontaneous  at   2023  3:35 AM     Cord: 3 vessels  present.   Nuchal Cord?  no   Cord blood obtained: No    Cord gases obtained:  Yes    Cord gas results: Venous:  No results found for: PHCVEN    Arterial:  No results found for: PHCART     Repair      Episiotomy: None     No    Lacerations: Yes  Laceration Information  Laceration Repaired?   Perineal: 1st  Yes    Periurethral:       Labial:       Sulcus:       Vaginal:       Cervical:         Suture used for repair: 3-0 Vicryl   Estimated Blood Loss:  minimal     Quantitative Blood Loss:          Complications     none    Disposition     Mother to Mother Baby/Postpartum  in stable condition currently.  Baby to remains with mom  in stable condition currently.    Ev Barth MD  23  04:00 CDT      Electronically signed by Ev Barth MD at 23 0404          Physician Progress Notes       Efrain Ramey MD at 23 1302              Efrain Ramey MD  [facility]  [address] [place] [address] [address] [place] [address]  Office [phone]  Fax [phone]      [facility]  Sammi Jensen  : 2005  MRN: [id number]  CSN: [id number]    Labor progress note    Subjective   She reports is having no problems    Objective   Min/max vitals past 24 hours:  No data recorded   BP  Min: 122/80  Max: 122/80   No data recorded   No data recorded        FHT's: reactive, reassuring and category 1.  external monitors used   Cervix: was not checked.   Contractions: none     Assessment   1. IUP at 39w0d  2. Abnormal BPP  3. Late transfer of prenatal care  4. GBs  unknown    Plan   Augment with pitocin  AROM - clear fluid seen  Allow labor to continue pending maternal and fetal status  Plan discussed with family and questions answered.  Understanding verbalized.    Efrain Ramey MD  4/4/2023  13:02 CDT    Electronically signed by Efrain Ramey MD at 04/04/23 1304         All Medication Administration from 04/04/2023 1038 to 04/05/2023 0331     Date/Time Order Dose Route Action Action by Comments    04/04/2023 2008 CDT  (ID# [id number]) sodium chloride 0.9 % flush 10 mL 10 mL Intravenous Not Given Deb Zhao RN --    04/04/2023 1130 CDT  (ID# [id number]) sodium chloride 0.9 % flush 10 mL 10 mL Intravenous Not Given Lisa Lazaro RN iv infusing    04/04/2023 1916 CDT  (ID# [id number]) lactated ringers infusion 125 mL/hr Intravenous New Bag Lisa Lazaro RN --    04/04/2023 1650 CDT  (ID# [id number]) lactated ringers infusion 125 mL/hr Intravenous Rate/Dose Change Lisa Lazaro RN --    04/04/2023 1600 CDT  (ID# [id number]) lactated ringers infusion 999 mL/hr Intravenous New Bag Lisa Lazaro RN --    04/04/2023 1550 CDT  (ID# [id number]) lactated ringers infusion 999 mL/hr Intravenous Rate/Dose Change Lisa Lazaro RN pre load for epidural    04/04/2023 1145 CDT  (ID# [id number]) lactated ringers infusion 125 mL/hr Intravenous New Bag Lisa Lazaro RN --    04/04/2023 1436 CDT  (ID# [id number]) butorphanol (STADOL) injection 1 mg 1 mg Intravenous Given Lisa Lazaro RN --    04/04/2023 1242 CDT  (ID# [id number]) penicillin G potassium 5 Million Units in sodium chloride 0.9 % 100 mL IVPB 5 Million Units Intravenous Given Lisa Lazaro RN --    04/04/2023 2133 CDT  (ID# [id number]) penicillin G in iso-osmotic dextrose IVPB 3 million units (premix) 3 Million Units Intravenous New Deb Donato RN --    04/04/2023 9380 CDT  (ID# [id number]) penicillin G in iso-osmotic dextrose IVPB 3 million units (premix) 3 Million Units Intravenous New Bag Tejas,  Lisa RAO RN --    04/04/2023 1430 CDT  (ID# [id number]) penicillin G in iso-osmotic dextrose IVPB 3 million units (premix) 3 Million Units Intravenous Not Given Lisa Lazaro RN time change entered; too close to previous administration    04/05/2023 0300 CDT  (ID# [id number]) oxytocin (PITOCIN) 30 units in 0.9% sodium chloride 500 mL (premix) 8 anastasia-units/min Intravenous Rate/Dose Change Karolyn Leos RN --    04/05/2023 0230 CDT  (ID# [id number]) oxytocin (PITOCIN) 30 units in 0.9% sodium chloride 500 mL (premix) 6 anastasia-units/min Intravenous Rate/Dose Change Deb Zhao RN --    04/05/2023 0200 CDT  (ID# [id number]) oxytocin (PITOCIN) 30 units in 0.9% sodium chloride 500 mL (premix) 4 anastasia-units/min Intravenous Rate/Dose Change Deb Zhao RN --    04/05/2023 0115 CDT  (ID# [id number]) oxytocin (PITOCIN) 30 units in 0.9% sodium chloride 500 mL (premix) 2 anastasia-units/min Intravenous Rate/Dose Change Deb Zhao RN --    04/05/2023 0000 CDT  (ID# [id number]) oxytocin (PITOCIN) 30 units in 0.9% sodium chloride 500 mL (premix) 0 anastasia-units/min Intravenous Stopped Deb Zhao RN --    04/04/2023 2100 CDT  (ID# [id number]) oxytocin (PITOCIN) 30 units in 0.9% sodium chloride 500 mL (premix) 20 anastasia-units/min Intravenous Rate/Dose Change Deb Zhao RN --    04/04/2023 2030 CDT  (ID# [id number]) oxytocin (PITOCIN) 30 units in 0.9% sodium chloride 500 mL (premix) 18 anastasia-units/min Intravenous Rate/Dose Change Deb Zhao RN --    04/04/2023 1930 CDT  (ID# [id number]) oxytocin (PITOCIN) 30 units in 0.9% sodium chloride 500 mL (premix) 16 anastasia-units/min Intravenous Rate/Dose Change Deb Zhao RN --    04/04/2023 1915 CDT  (ID# [id number]) oxytocin (PITOCIN) 30 units in 0.9% sodium chloride 500 mL (premix) 14 anastasia-units/min Intravenous Handoff Lisa Lazaro RN --    04/04/2023 1740 CDT  (ID# [id number]) oxytocin (PITOCIN) 30 units in 0.9% sodium chloride 500 mL (premix) 14 anastasia-units/min  Intravenous Rate/Dose Change Lisa Lazaro RN --    2023 1705 CDT  (ID# [id number]) oxytocin (PITOCIN) 30 units in 0.9% sodium chloride 500 mL (premix) 12 anastasia-units/min Intravenous Rate/Dose Change Lisa Lazaro RN --    2023 1515 CDT  (ID# [id number]) oxytocin (PITOCIN) 30 units in 0.9% sodium chloride 500 mL (premix) 10 anastasia-units/min Intravenous Rate/Dose Change Lisa Lazaro RN --    2023 1430 CDT  (ID# [id number]) oxytocin (PITOCIN) 30 units in 0.9% sodium chloride 500 mL (premix) 8 anastasia-units/min Intravenous Rate/Dose Change Lisa Lazaro RN --    2023 1349 CDT  (ID# [id number]) oxytocin (PITOCIN) 30 units in 0.9% sodium chloride 500 mL (premix) 6 anastasia-units/min Intravenous Rate/Dose Change Lisa Lazaro RN --    2023 1316 CDT  (ID# [id number]) oxytocin (PITOCIN) 30 units in 0.9% sodium chloride 500 mL (premix) 4 anastasia-units/min Intravenous Rate/Dose Change Lisa Lazaro RN --    2023 1243 CDT  (ID# [id number]) oxytocin (PITOCIN) 30 units in 0.9% sodium chloride 500 mL (premix) 2 anastasia-units/min Intravenous New Bag Lisa Lazaro RN --    2023 1130 CDT  (ID# [id number]) oxytocin (PITOCIN) 30 units in 0.9% sodium chloride 500 mL (premix) 2 anastasia-units/min Intravenous Not Given Lisa Lazaro RN DUPLICATE MAR ENTRY    2023 0034 CDT  (ID# [id number]) oxytocin (PITOCIN) 30 units in 0.9% sodium chloride 500 mL (premix) 999 mL/hr Intravenous New Bag Karolyn Leos RN --    2023 1903 CDT  (ID# [id number]) Sod Citrate-Citric Acid (BICITRA) solution 30 mL 30 mL Oral Not Given Lisa Lazaro RN --     Geovanna Jensen [[id number]]    Labor Events     labor?: No   steroids?: None  Antibiotics during labor?: Yes  Rupture date/time: 2023 1257  Rupture type: artificial rupture of membranes  Fluid color: normal  Fluid odor: None  Labor type: Induced Onset of Labor  Labor allowed to proceed with plans for an attempted  vaginal birth?: Yes  Induction: Oxytocin, AROM  Induction date/time: 2023 2100  Induction indications: Other (see Comments)  Complications: None  Labor Event Times    Labor onset date/time: 2023 1257  Dilation complete date/time: 2023 2355  Start pushing date/time: 2023 0300  Anesthesia    Method: Epidural        Shoulder Dystocia    Shoulder dystocial present?: No  Presentation    Presentation: Vertex  Position: Occiput   Delivery    Delivery date/time:  2023  3:31 AM   Delivery type: Vaginal, Spontaneous   Details:  Trial of labor?: Yes        Operative Delivery    Forceps attempted?: No  Vacuum extractor attempted?: No                  Delivery Providers    Delivering clinician: Ev Barth MD  Other personnel:  Provider Role   Deb Zhao RN Circulator   Quyen Arellano RN Baby Nurse   Jeevan Wilson, CRNA Anesthesia Provider   Karolyn Leos, RN Registered Nurse   Larisa Paige RN Registered Nurse      Others in Delivery Room: mother  Cord    Vessels: 3 vessels  Complications: None  Delayed cord clamping?: Yes  Cord clamped date/time: 2023  3:32 AM  Umbilical Cord Milked?: Neg  Gases sent?: Yes  Gases from?: arterial drawn, venous drawn  Cord Blood Obtained: No  Cord Segment Obtained: Yes  Cord comments: aisha Arellano RN  Stem cell collection (by MD)?: No  Placenta    Placenta delivery date/time: 2023 0335  Placenta removal: Spontaneous  Placenta appearance: Intact  Placenta disposition: lab, pathology  Resuscitation    Method: Suctioning, Tactile Stimulation  Suction Details  Suction method: bulb syringe  Airway suction: mouth, nose  Oxygen Details   Assessment    Living status: Living  Infant family band number: [id number]  Alarm device number: [id number]  Apgars   1 Minute:  5 Minute:  10 Minute 15 Minute 20 Minute   Skin Color: 0  1       Heart Rate: 2  2       Reflex Irritability: 2  2       Muscle Tone: 2  2       Respiratory Effort: 2  2     "   Total: 8  9               Apgars Assigned By: CELESTINE RUFF RN  Skin to Skin    Reason skin to skin not initiated:  Acuity  Measurements    Weight: 7 lb 3 oz 3260 g Length: 20.5\"   Birth comments: HC 34cm AGA  Delivery Information    Episiotomy: None  Perineal lacerations: 1st Repaired: Yes   Surgical or additional est. blood loss (mL): 0  Combined est. blood loss (mL): 0  Vaginal Counts    Initial count personnel: DOMINGO HARO RN  Initial count verified by: JALEN FIELD RNC   4x4 Needles Lap Pads Instruments Sponges [employer] Vag Packing   Initial counts  3 1 15  10    Added to  Count          Final counts  3 1 15  10    Final count personnel: DR. EDWARDS  Final count verified by: PAGE ASHU OBT  Accurate final count?: Yes  Procedures    Procedures: None  Procedure Comments:   Labor Length    1st stage: 10h 58m  2nd stage: 3h 36m  3rd stage: 0h 04m      "

## 2023-04-06 LAB
ANISOCYTOSIS BLD QL: ABNORMAL
BASOPHILS # BLD MANUAL: 0.16 10*3/MM3 (ref 0–0.2)
BASOPHILS NFR BLD MANUAL: 1 % (ref 0–1.5)
DEPRECATED RDW RBC AUTO: 54.5 FL (ref 37–54)
EOSINOPHIL # BLD MANUAL: 0.31 10*3/MM3 (ref 0–0.4)
EOSINOPHIL NFR BLD MANUAL: 2 % (ref 0.3–6.2)
ERYTHROCYTE [DISTWIDTH] IN BLOOD BY AUTOMATED COUNT: 17.1 % (ref 12.3–15.4)
GP B STREP DNA SPEC QL NAA+PROBE: NEGATIVE
HCT VFR BLD AUTO: 24.3 % (ref 34–46.6)
HGB BLD-MCNC: 6.9 G/DL (ref 12–15.9)
HYPOCHROMIA BLD QL: ABNORMAL
LYMPHOCYTES # BLD MANUAL: 3.98 10*3/MM3 (ref 0.7–3.1)
LYMPHOCYTES NFR BLD MANUAL: 2 % (ref 5–12)
MCH RBC QN AUTO: 25.5 PG (ref 26.6–33)
MCHC RBC AUTO-ENTMCNC: 28.4 G/DL (ref 31.5–35.7)
MCV RBC AUTO: 89.7 FL (ref 79–97)
MONOCYTES # BLD: 0.31 10*3/MM3 (ref 0.1–0.9)
NEUTROPHILS # BLD AUTO: 10.84 10*3/MM3 (ref 1.7–7)
NEUTROPHILS NFR BLD MANUAL: 67.3 % (ref 42.7–76)
NEUTS BAND NFR BLD MANUAL: 2 % (ref 0–5)
PLAT MORPH BLD: NORMAL
PLATELET # BLD AUTO: 204 10*3/MM3 (ref 140–450)
PMV BLD AUTO: 12.1 FL (ref 6–12)
POLYCHROMASIA BLD QL SMEAR: ABNORMAL
RBC # BLD AUTO: 2.71 10*6/MM3 (ref 3.77–5.28)
VARIANT LYMPHS NFR BLD MANUAL: 25.5 % (ref 19.6–45.3)
WBC MORPH BLD: NORMAL
WBC NRBC COR # BLD: 15.62 10*3/MM3 (ref 3.4–10.8)

## 2023-04-06 PROCEDURE — 99231 SBSQ HOSP IP/OBS SF/LOW 25: CPT | Performed by: OBSTETRICS & GYNECOLOGY

## 2023-04-06 PROCEDURE — 85025 COMPLETE CBC W/AUTO DIFF WBC: CPT | Performed by: OBSTETRICS & GYNECOLOGY

## 2023-04-06 PROCEDURE — 85007 BL SMEAR W/DIFF WBC COUNT: CPT | Performed by: OBSTETRICS & GYNECOLOGY

## 2023-04-06 RX ORDER — FERROUS SULFATE 325(65) MG
325 TABLET ORAL
Status: DISCONTINUED | OUTPATIENT
Start: 2023-04-06 | End: 2023-04-07 | Stop reason: HOSPADM

## 2023-04-06 RX ADMIN — FERROUS SULFATE TAB 325 MG (65 MG ELEMENTAL FE) 325 MG: 325 (65 FE) TAB at 10:39

## 2023-04-06 RX ADMIN — DOCUSATE SODIUM 100 MG: 100 CAPSULE ORAL at 08:37

## 2023-04-06 NOTE — PLAN OF CARE
Goal Outcome Evaluation:  Plan of Care Reviewed With: patient  VSS;  FFMLUU-U1 with small lochia; voiding with no issues; ambulating in room; breastfeeding well with min assist. Comfort products at bedside for 1st degree lac. PRN motrin given x1 for discomfort. Pt.attentive to NB needs.

## 2023-04-06 NOTE — PROGRESS NOTES
"      Daron Wood MD  [facility]  [address]  [place] [address]  Office [phone]  Fax [phone]    [facility]  Vaginal Delivery Progress Note    Subjective   Postpartum Day 1: Vaginal Delivery    The patient feels well.  Her pain is well controlled with nonsteroidal anti-inflammatory drugs.   She is ambulating well.  Patient describes her bleeding as thin lochia.    Breastfeeding: declines.    Objective     Vital Signs Range for the last 24 hours  Temperature: Temp:  [97.8 °F (36.6 °C)-98.7 °F (37.1 °C)] 98.6 °F (37 °C)   Temp Source: Temp src: Temporal   BP: BP: (111-128)/(61-75) 111/65   Pulse: Heart Rate:  [] 82   Respirations: Resp:  [16-18] 16   SPO2: SpO2:  [97 %-99 %] 98 %   O2 Amount (l/min):     O2 Devices Device (Oxygen Therapy): room air   Weight:       Admit Height:  Height: 167.6 cm (66\")      Physical Exam:  General:  no acute distresss.  Abdomen: Fundus: appropriate, firm, non tender  Extremities: normal, atraumatic, no cyanosis, and trace edema.       Lab results reviewed:  Yes   Rubella:  No results found for: RUBELLAIGGIN Nurse Transcribed from prenatal record --  No components found for: EXTRUBELQUAL  Rh Status:    RH type   Date Value Ref Range Status   04/04/2023 Positive  Final     Rh Factor   Date Value Ref Range Status   04/04/2023 Positive  Final     Comment:     Please note: Prior records for this patient's ABO / Rh type are not  available for additional verification.       Immunizations: There is no immunization history for the selected administration types on file for this patient.  Lab Results (last 24 hours)     Procedure Component Value Units Date/Time    Manual Differential [[id number]]  (Abnormal) Collected: 04/06/23 0712    Specimen: Blood Updated: 04/06/23 0752     Neutrophil % 67.3 %      Lymphocyte % 25.5 %      Monocyte % 2.0 %      Eosinophil % 2.0 %      Basophil % 1.0 %      Bands %  2.0 %      Neutrophils Absolute 10.84 10*3/mm3      " Lymphocytes Absolute 3.98 10*3/mm3      Monocytes Absolute 0.31 10*3/mm3      Eosinophils Absolute 0.31 10*3/mm3      Basophils Absolute 0.16 10*3/mm3      Anisocytosis Slight/1+     Hypochromia Slight/1+     Polychromasia Slight/1+     WBC Morphology Normal     Platelet Morphology Normal    CBC & Differential [[id number]]  (Abnormal) Collected: 04/06/23 0712    Specimen: Blood Updated: 04/06/23 0752    Narrative:      The following orders were created for panel order CBC & Differential.  Procedure                               Abnormality         Status                     ---------                               -----------         ------                     CBC Auto Differential[[id number]]        Abnormal            Final result                 Please view results for these tests on the individual orders.    CBC Auto Differential [[id number]]  (Abnormal) Collected: 04/06/23 0712    Specimen: Blood Updated: 04/06/23 0752     WBC 15.62 10*3/mm3      RBC 2.71 10*6/mm3      Hemoglobin 6.9 g/dL      Hematocrit 24.3 %      MCV 89.7 fL      MCH 25.5 pg      MCHC 28.4 g/dL      RDW 17.1 %      RDW-SD 54.5 fl      MPV 12.1 fL      Platelets 204 10*3/mm3           External Prenatal Results     Pregnancy Outside Results - Transcribed From Office Records - See Scanned Records For Details     Test Value Date Time    ABO  A  04/04/23 1154    Rh  Positive  04/04/23 1154    Antibody Screen  Negative  04/04/23 1154       Negative  04/04/23 0903    Varicella IgG  276 index 04/04/23 0903    Rubella  1.80 index 04/04/23 1154       1.73 index 04/04/23 0903      ^ Positive  09/20/22     Hgb  6.9 g/dL 04/06/23 0712       8.9 g/dL 04/04/23 1154       9.3 g/dL 04/04/23 0903    Hct  24.3 % 04/06/23 0712       30.1 % 04/04/23 1154       30.1 % 04/04/23 0903    Glucose Fasting GTT       Glucose Tolerance Test 1 hour       Glucose Tolerance Test 3 hour       Gonorrhea (discrete)       Chlamydia (discrete)       RPR  Non-Reactive  04/04/23 1154        Non Reactive  04/04/23 0903    VDRL       Syphilis Antibody ^ NONREACTIVE  09/20/22     HBsAg  Non-Reactive  04/04/23 1154       Negative  04/04/23 0903      ^ Negative  09/20/22     Herpes Simplex Virus PCR       Herpes Simplex VIrus Culture       HIV  Non-Reactive  04/04/23 1154       Non Reactive  04/04/23 0903    Hep C RNA Quant PCR       Hep C Antibody  Non-Reactive  04/04/23 1154       Non Reactive  04/04/23 0903    AFP       Group B Strep       GBS Susceptibility to Clindamycin       GBS Susceptibility to Erythromycin       Fetal Fibronectin       Genetic Testing, Maternal Blood             Drug Screening     Test Value Date Time    Urine Drug Screen       Amphetamine Screen  Negative  04/04/23 1119    Barbiturate Screen  Negative  04/04/23 1119    Benzodiazepine Screen  Negative  04/04/23 1119    Methadone Screen  Negative  04/04/23 1119    Phencyclidine Screen  Negative  04/04/23 1119    Opiates Screen  Negative  04/04/23 1119    THC Screen  Negative  04/04/23 1119    Cocaine Screen       Propoxyphene Screen  Negative  04/04/23 1119    Buprenorphine Screen  Negative  04/04/23 1119    Methamphetamine Screen       Oxycodone Screen  Negative  04/04/23 1119    Tricyclic Antidepressants Screen  Negative  04/04/23 1119          Legend    ^: Historical                        Assessment & Plan         Sammi Jensen is Day 1  post-partum  Vaginal, Spontaneous   .      Plan:  Continue current care. Appropriate drop in Hgb, will start iron supplement. Patient asymptomatic from her anemia.      Daron Wood MD  4/6/2023  09:21 CDT

## 2023-04-06 NOTE — LACTATION NOTE
Mother's Name: Sammi     Phone #: [phone]  Infant Name: Giovany  : 23  Gestation: 39w1d  Day of life: 1  Birth weight: 7-3 (3260g) Discharge weight:  Weight Loss: -3.83%  24 hour Summary of Feeds: 5 BF + 6 ml EBM  Voids: 2  Stools: 5 Emesis: 2  Assistive devices (shields, shells, etc):   Significant Maternal history:   Maternal Concerns: None at this time   Maternal Goal: Unsure  Mother's Medications:  PNV  Breastpump for home: Motif delivered from [employer]  Ped follow up appt:     Patient reports continued latching difficulty. She has decided to supplement for now as her nipples are sore. She has pumped at total of 2 times collecting 6 and 8 ml. Much encouragement provided for collected amounts. Discussed expected amounts collected with pumping at this time, supply/demand, and the need to pump every time infant is formula fed to build supply. Patient and SO think the 24 mm flanges may be too large. 21 mm flanges provided. Gave Breastfeeding After Discharge packet. Recommended patient and SO read. Lactation to review tomorrow. Questions denied.    Instructed mom our lactation team is here for continued support throughout their breastfeeding journey. Our team has encouraged mom to call with any questions or concerns that may arise after discharge.

## 2023-04-06 NOTE — PLAN OF CARE
Goal Outcome Evaluation:      VSS. FF, ML, U1, scant. Voiding and ambulating. No pain meds given this shift.  brought carseat for baby today. Scheduled ferrous sulfate given this AM. Breastfeeding and pumping. Formula fed infant 2x this shift. Comfort products at bedside. Significant other at bedside, attentive to patient. Attentive to infant cues.

## 2023-04-06 NOTE — CASE MANAGEMENT/SOCIAL WORK
Continued Stay Note   [place]     Patient Name: Sammi Jensen  MRN: [id number]  Today's Date: 4/6/2023    Admit Date: 4/4/2023        Discharge Plan     Row Name 04/06/23 0952       Plan    Plan Comments Car seat provided by case management.  Patient was agreeable to HANDS program referral, referral made.  Mother denied further needs stating she had bottles, diapers and everything else.               Discharge Codes    No documentation.                     MARIA ELENA VirkW

## 2023-04-07 VITALS
TEMPERATURE: 98.1 F | HEART RATE: 86 BPM | SYSTOLIC BLOOD PRESSURE: 125 MMHG | WEIGHT: 218 LBS | BODY MASS INDEX: 35.03 KG/M2 | OXYGEN SATURATION: 98 % | RESPIRATION RATE: 18 BRPM | HEIGHT: 66 IN | DIASTOLIC BLOOD PRESSURE: 66 MMHG

## 2023-04-07 PROCEDURE — 99238 HOSP IP/OBS DSCHRG MGMT 30/<: CPT | Performed by: OBSTETRICS & GYNECOLOGY

## 2023-04-07 RX ORDER — FERROUS SULFATE 325(65) MG
325 TABLET ORAL
Qty: 30 TABLET | Refills: 1 | Status: SHIPPED | OUTPATIENT
Start: 2023-04-07

## 2023-04-07 RX ADMIN — FERROUS SULFATE TAB 325 MG (65 MG ELEMENTAL FE) 325 MG: 325 (65 FE) TAB at 09:00

## 2023-04-07 NOTE — PLAN OF CARE
Problem: Adult Inpatient Plan of Care  Goal: Plan of Care Review  Outcome: Ongoing, Progressing  Flowsheets (Taken 4/7/2023 0621)  Progress: improving  Plan of Care Reviewed With:   patient   spouse  Outcome Evaluation:   VSS   voiding and ambulating   FFML UU with scant lochia   rated pain low this shift without PO pain meds   breastfeeding and pumping independently   bonding well with baby  Goal: Patient-Specific Goal (Individualized)  Outcome: Ongoing, Progressing  Goal: Absence of Hospital-Acquired Illness or Injury  Outcome: Ongoing, Progressing  Intervention: Identify and Manage Fall Risk  Recent Flowsheet Documentation  Taken 4/7/2023 0520 by Rochelle Holt RN  Safety Promotion/Fall Prevention: safety round/check completed  Taken 4/7/2023 0401 by Rochelle Holt RN  Safety Promotion/Fall Prevention: safety round/check completed  Taken 4/7/2023 0340 by Rochelle Holt RN  Safety Promotion/Fall Prevention: safety round/check completed  Taken 4/7/2023 0230 by Rochelle Holt RN  Safety Promotion/Fall Prevention: safety round/check completed  Taken 4/7/2023 0140 by Rochelle Holt RN  Safety Promotion/Fall Prevention: safety round/check completed  Taken 4/7/2023 0040 by Rochelle Holt RN  Safety Promotion/Fall Prevention: safety round/check completed  Taken 4/6/2023 2350 by Rochelle Holt RN  Safety Promotion/Fall Prevention: safety round/check completed  Taken 4/6/2023 2245 by Rochelle Holt RN  Safety Promotion/Fall Prevention: safety round/check completed  Taken 4/6/2023 2143 by Rochelle Holt RN  Safety Promotion/Fall Prevention: safety round/check completed  Taken 4/6/2023 2030 by Rochelle Holt RN  Safety Promotion/Fall Prevention: safety round/check completed  Taken 4/6/2023 1920 by Rochelle Holt RN  Safety Promotion/Fall Prevention: safety round/check completed  Intervention: Prevent and Manage VTE (Venous Thromboembolism) Risk  Recent Flowsheet Documentation  Taken 4/6/2023 2030 by Rochelle Holt  RN  Activity Management:   activity encouraged   ambulated in room  VTE Prevention/Management: (frequently ambulating in room) other (see comments)  Goal: Optimal Comfort and Wellbeing  Outcome: Ongoing, Progressing  Goal: Readiness for Transition of Care  Outcome: Ongoing, Progressing     Problem: Adjustment to Role Transition (Postpartum Vaginal Delivery)  Goal: Successful Maternal Role Transition  Outcome: Ongoing, Progressing     Problem: Bleeding (Postpartum Vaginal Delivery)  Goal: Hemostasis  Outcome: Ongoing, Progressing     Problem: Infection (Postpartum Vaginal Delivery)  Goal: Absence of Infection Signs/Symptoms  Outcome: Ongoing, Progressing     Problem: Pain (Postpartum Vaginal Delivery)  Goal: Acceptable Pain Control  Outcome: Ongoing, Progressing     Problem: Urinary Retention (Postpartum Vaginal Delivery)  Goal: Effective Urinary Elimination  Outcome: Ongoing, Progressing   Goal Outcome Evaluation:  Plan of Care Reviewed With: patient, spouse        Progress: improving  Outcome Evaluation: VSS; voiding and ambulating; FFML UU with scant lochia; rated pain low this shift without PO pain meds; breastfeeding and pumping independently; bonding well with baby

## 2023-04-07 NOTE — PROGRESS NOTES
"      LAURE Hickey  [facility]  [address]  [place] [address]  Office [phone]  Fax [phone]    [facility]  Vaginal Delivery Progress Note    Subjective   Postpartum Day 2: Vaginal Delivery    The patient feels well.  Her pain is  controlled with nonsteroidal anti-inflammatory drugs.   She is ambulating well.  Patient describes her bleeding as thin lochia. She denies dizziness, syncope, shortness of breath.     Breastfeeding: declines.    Objective     Vital Signs Range for the last 24 hours  Temperature: Temp:  [97.9 °F (36.6 °C)-98 °F (36.7 °C)] 98 °F (36.7 °C)   Temp Source: Temp src: Oral   BP: BP: (126-130)/(69-76) 126/69   Pulse: Heart Rate:  [] 100   Respirations: Resp:  [16] 16   SPO2: SpO2:  [96 %-98 %] 96 %   O2 Amount (l/min):     O2 Devices Device (Oxygen Therapy): room air   Weight:       Admit Height:  Height: 167.6 cm (66\")      Physical Exam:  General:  no acute distresss.  Abdomen: abdomen is soft without significant tenderness, masses, organomegaly or guarding. Fundus: appropriate, firm, non tender  Extremities: normal, atraumatic, no cyanosis, and trace edema.       Lab results reviewed:  Yes   Rubella:  No results found for: RUBELLAIGGIN Nurse Transcribed from prenatal record --  No components found for: EXTRUBELQUAL  Rh Status:    RH type   Date Value Ref Range Status   04/04/2023 Positive  Final     Rh Factor   Date Value Ref Range Status   04/04/2023 Positive  Final     Comment:     Please note: Prior records for this patient's ABO / Rh type are not  available for additional verification.       Immunizations: There is no immunization history for the selected administration types on file for this patient.  Lab Results (last 24 hours)     Procedure Component Value Units Date/Time    Manual Differential [[id number]]  (Abnormal) Collected: 04/06/23 0712    Specimen: Blood Updated: 04/06/23 0752     Neutrophil % 67.3 %      Lymphocyte % 25.5 %      Monocyte % " 2.0 %      Eosinophil % 2.0 %      Basophil % 1.0 %      Bands %  2.0 %      Neutrophils Absolute 10.84 10*3/mm3      Lymphocytes Absolute 3.98 10*3/mm3      Monocytes Absolute 0.31 10*3/mm3      Eosinophils Absolute 0.31 10*3/mm3      Basophils Absolute 0.16 10*3/mm3      Anisocytosis Slight/1+     Hypochromia Slight/1+     Polychromasia Slight/1+     WBC Morphology Normal     Platelet Morphology Normal    CBC & Differential [[id number]]  (Abnormal) Collected: 04/06/23 0712    Specimen: Blood Updated: 04/06/23 0752    Narrative:      The following orders were created for panel order CBC & Differential.  Procedure                               Abnormality         Status                     ---------                               -----------         ------                     CBC Auto Differential[[id number]]        Abnormal            Final result                 Please view results for these tests on the individual orders.    CBC Auto Differential [[id number]]  (Abnormal) Collected: 04/06/23 0712    Specimen: Blood Updated: 04/06/23 0752     WBC 15.62 10*3/mm3      RBC 2.71 10*6/mm3      Hemoglobin 6.9 g/dL      Hematocrit 24.3 %      MCV 89.7 fL      MCH 25.5 pg      MCHC 28.4 g/dL      RDW 17.1 %      RDW-SD 54.5 fl      MPV 12.1 fL      Platelets 204 10*3/mm3           External Prenatal Results     Pregnancy Outside Results - Transcribed From Office Records - See Scanned Records For Details     Test Value Date Time    ABO  A  04/04/23 1154    Rh  Positive  04/04/23 1154    Antibody Screen  Negative  04/04/23 1154       Negative  04/04/23 0903    Varicella IgG  276 index 04/04/23 0903    Rubella  1.80 index 04/04/23 1154       1.73 index 04/04/23 0903      ^ Positive  09/20/22     Hgb  6.9 g/dL 04/06/23 0712       8.9 g/dL 04/04/23 1154       9.3 g/dL 04/04/23 0903    Hct  24.3 % 04/06/23 0712       30.1 % 04/04/23 1154       30.1 % 04/04/23 0903    Glucose Fasting GTT       Glucose Tolerance Test 1 hour        Glucose Tolerance Test 3 hour       Gonorrhea (discrete)       Chlamydia (discrete)       RPR  Non-Reactive  04/04/23 1154       Non Reactive  04/04/23 0903    VDRL       Syphilis Antibody ^ NONREACTIVE  09/20/22     HBsAg  Non-Reactive  04/04/23 1154       Negative  04/04/23 0903      ^ Negative  09/20/22     Herpes Simplex Virus PCR       Herpes Simplex VIrus Culture       HIV  Non-Reactive  04/04/23 1154       Non Reactive  04/04/23 0903    Hep C RNA Quant PCR       Hep C Antibody  Non-Reactive  04/04/23 1154       Non Reactive  04/04/23 0903    AFP       Group B Strep  Negative  04/04/23 0915    GBS Susceptibility to Clindamycin       GBS Susceptibility to Erythromycin       Fetal Fibronectin       Genetic Testing, Maternal Blood             Drug Screening     Test Value Date Time    Urine Drug Screen       Amphetamine Screen  Negative  04/04/23 1119    Barbiturate Screen  Negative  04/04/23 1119    Benzodiazepine Screen  Negative  04/04/23 1119    Methadone Screen  Negative  04/04/23 1119    Phencyclidine Screen  Negative  04/04/23 1119    Opiates Screen  Negative  04/04/23 1119    THC Screen  Negative  04/04/23 1119    Cocaine Screen       Propoxyphene Screen  Negative  04/04/23 1119    Buprenorphine Screen  Negative  04/04/23 1119    Methamphetamine Screen       Oxycodone Screen  Negative  04/04/23 1119    Tricyclic Antidepressants Screen  Negative  04/04/23 1119          Legend    ^: Historical                        Assessment & Plan       * No active hospital problems. *      Sammi Jensen is Day 2  post-partum  Vaginal, Spontaneous   .      Plan:  Continue current care. Patient to continue with iron supplement. Discharge home with standard precautions and return to clinic in 2-3 weeks for follow-up.        Plan for discharge reviewed with Dr. Dukes.    This note has been signed electronically.    Ella Babin, DNP, APRN, CNM, RNC-OB  4/7/2023  07:49 CDT

## 2023-04-07 NOTE — NURSING NOTE
This patient attended the Discharge Teaching Class during their stay. Information was taught out of the Postpartum and [place] Care booklet. Topics included in the discharge class are:    - Uterine and lochia changes  - Possible Postpartum Complications  - Perineal and Incisional care including Pelvic Rest  - Postpartum Depression and Anxiety  - Importance of notifying MD if any complications or concerns arise.     Additional topics included in the class related to  Care are:    - Caring for a  including use of bulb syringe  - Changes in  behavior  - Safe sleep and safety precautions  - Shaken Baby Syndrome  - Importance of notifying MD if concerns or complications arise.

## 2023-04-07 NOTE — LACTATION NOTE
Mother's Name: Sammi     Phone #: [phone]  Infant Name: Giovany  : 23  Gestation: 39w1d  Day of life: 2  Birth weight: 7-3 (3260g) Discharge weight: 6-13.5 (3105g)  Weight Loss: -4.75%  24 hour Summary of Feeds: 2BF EBM 82 ml Formula 10 ml Voids: 4  Stools: 3  Assistive devices (shields, shells, etc): NA  Significant Maternal history:   Maternal Concerns: None at this time   Maternal Goal: Unsure  Mother's Medications:  PNV  Breastpump for home: Motif delivered from [employer]  Ped follow up appt: Caryn Rosen APRN     F/u with parents to discuss breastfeeding/pumping progress. Report they have mainly bottle fed and pumping. Mother's milk beginning to transition, last pumping session yield of at least 2 oz. Praise provided. Encourage mother to continue breastfeeding attempts as tolerated as long as mother desires to make direct breastfeeding her goal. Encourage follow up with [facility] lactation early next week to work on and assess latch/milk transfer. Mother desires to call back later to set up appt. Breastfeeding after discharge handout reviewed in detail. Appropriate questions regarding pumping addressed. Discussed paced bottle feeding. Discussed milk supply/demand and reiterated importance of pumping anytime bfing not attempted or bottle given. Further questions denied. Encouragement and support provided.       Instructed mom our lactation team is here for continued support throughout their breastfeeding journey. Our team has encouraged mom to call with any questions or concerns that may arise after discharge.      Signs of Milk: Fullness, firmness, heaviness of breasts, leaking of milk.  Signs of Good Feed: Breast fullness prior to feed, breasts soft and comfortable after feeding. Infant content after feeding: calm, sleepy, relaxed and without continued hunger cues.  Signs of Plugged Ducts, Engorgement and Mastitis: Plugged ducts (milk entrapment in milk ducts)- small tender knots that  often feel like little beans under breast tissue, usually tender. Massage on these areas of concern while breastfeeding or pumping to promote emptying.   Engorgement- fluid or excess milk, breasts become uncomfortably full, tight, firm (compare to the firmness of your cheek (mild), chin (moderate) or forehead (severe). First line of treatment should be to BREASTFEED, if breasts remain full feeling after a feeding, it may be necessary to pump, ONLY UNTIL BREASTS ARE SOFT AND COMFORTABLE. DO NOT OVER PUMP (complete emptying of breasts can trigger even more milk which will cause continued, recurrent Engorgement).  Mastitis- Infection of the breast tissue, most often caused by plugged ducts that are not adequately treated by emptying, recurrent trauma to nipples or breasts (cracked or bleeding nipples). Signs: redness, swelling, tender knots or fever to breasts as well as generalized fever >101 degrees F that is often sudden onset. Treatment of mastitis, BREASTFEED! Pump after breastfeeding to achieve COMPLETE emptying of affected breast, utilizing massage to areas of concern, may use cold compress to affected area only after breast emptying. May take anti-inflammatories i.e. Ibuprofen, Motrin. CALL your OB for assessment and continued treatment with Antibiotics to adequately treat mastitis.  Infant Care: Over the first 2 weeks it is important to keep record of infant's feeding routine (feeding times and durations), wet and dirty diaper frequency, stool color and any spit ups that may occur.  Keep in mind, ALL babies will lose some weight initially (usually no more than 10% by day 3). Until infant returns to/ surpasses birth weight (which can take up to 2 weeks), it is important to offer feedings AT LEAST EVERY 3 HOURS. Remember, if you choose to supplement infant with formula or previously pumped milk, you should always pump in replacement of that feeding in order to promote and maintain a healthy milk  supply!  Maternal Care: REST, sleep when the infant sleeps, stay hydrated (water is optimal) drink to thirst, increase caloric intake - breastfeeding mother's need an ADDITIONAL 500 calories per day , eat 3 meals/day as well as snacks in between, limit CAFFIENE intake to 2 cups/day. Ask your significant other, family members or friends for help when needed, taking advantage of meal trains, allowing others to help with laundry, house chores, etc can help you focus on what is most important early on after delivery… you and your infant, and breastfeeding!   Medications to CONTINUE: Prenatal Vitamins are important to continue taking while breastfeeding. Fish oil, magnesium/calcium supplements often are helpful to support Mothers and their milk supply as well. Tylenol, Ibuprofen, regular Zyrtec, Claritin are SAFE if you suffer from seasonal allergies. Flonase is safe and often an effective medication to take if suffering from sinus drainage/pressure.  Medications to AVOID: Benadryl, Sudafed, any medications including “DM” or have a drying effect to sinus drainage will also dry a mother's milk up. Birth control- your OB will want to address birth control options with you usually around 4-6 weeks postpartum, be sure to notify your MD if you continue to breastfeed as some birth controls may significantly decrease your milk supply. Herbals- some herbs may also decrease your milk supply: PEPPERMINT, MENTHOL in any form (candies, essential oils, teas, etc), so check labels and avoid using in excess.  Pumping: Although we encourage you to focus on breastfeeding over the first 2-4 weeks, you will need to plan to begin pumping. We do recommend implementing pumping by the time infant is 4 weeks old. Pump 2-3 times per day immediately AFTER breastfeeding, it is normal to collect very small amounts initially, but the more consistently you pump, the more you will begin to collect. Store collected milk in refrigerator or freezer. You  should also begin offering infant a bottle around 4 weeks. Remember to use slow flow nipples and PACE the bottle-feed. A bottle feed should take about as long as a breastfeeding session.

## 2023-04-07 NOTE — PAYOR COMM NOTE
"[facility]# [id number]              Discharge notification    4/7/2023  Mom & infant dc'd home    [facility]      P: [phone]       F: [phone]    Macey Marshall (18 y.o. Female)     Date of Birth   2005    Social Security Number       Address   [address] [place] [facility] [address]    Home Phone   [phone]    MRN   [id number]       Pent[employer]al   Patient Refused    Marital Status   Single                            Admission Date   4/4/23    Admission Type   Elective    Admitting Provider   Efrain Ramey MD    Attending Provider       Department, Room/Bed   [facility] MOTHER BABY 2A, M222/1       Discharge Date   4/7/2023    Discharge Disposition   Home or Self Care    Discharge Destination                               Attending Provider: (none)   Allergies: Tuna Oil    Isolation: None   Infection: None   Code Status: CPR    Ht: 167.6 cm (66\")   Wt: 98.9 kg (218 lb)    Admission Cmt: None   Principal Problem: Abnormal obstetric ultrasound scan [O28.3]                 Active Insurance as of 4/4/2023     Primary Coverage     Payor Plan Insurance Group Employer/Plan Group    ANTHEM MEDICAID ANTHEM MEDICAID [id number]     Payor Plan Address Payor Plan Phone Number Payor Plan Fax Number Effective Dates    [address] [address] [phone]  3/1/2022 - None Entered    [facility] [address]       Subscriber Name Subscriber Birth Date Member ID       MACEY MARSHALL 2005 [id number]                 Emergency Contacts      (Rel.) Home Phone Work Phone Mobile Phone    NORMA VASQUEZ (Mother) -- -- [phone]               Discharge Summary      Ella Babin APRN at 04/07/23 0753     Attestation signed by Martin Dukes MD at 04/07/23 0841    I have reviewed this documentation and agree.                  [facility]    LAURE Hickey  [address]  [place] [address] [phone]      Discharge " Summary     Gamal Jensen  : 2005  MRN: [id number]  CSN: [id number]    Date of Admission: 2023   Date of Discharge:  2023   Delivering Physician: Ev Barth        Admission Diagnosis: 1. Abnormal obstetric ultrasound scan [O28.3]   Discharge Diagnosis: 1. Pregnancy at 39w1d - delivered       Procedures: 2023  - Vaginal, Spontaneous       Hospital Course  Patient is a 18 y.o.  who at 39w1d had a vaginal birth.  Her postpartum course was without complications.  On PPD #2 she was ready for discharge.  She had normal lochia and pain well controlled with oral medications.    Infant  male  fetus weighing 3260 g (7 lb 3 oz)   Apgars -  8 @ 1 minute /  9 @ 5 minutes.    Discharge labs  Lab Results   Component Value Date    WBC 15.62 (H) 2023    HGB 6.9 (C) 2023    HCT 24.3 (L) 2023     2023       Discharge Medications     Discharge Medications      New Medications      Instructions Start Date   ferrous sulfate 325 (65 FE) MG tablet   325 mg, Oral, Daily With Breakfast         Continue These Medications      Instructions Start Date   prenatal (CLASSIC) vitamin  tablet  Generic drug: prenatal vitamin   Oral, Daily             External Prenatal Results     Pregnancy Outside Results - Transcribed From Office Records - See Scanned Records For Details     Test Value Date Time    ABO  A  23 1154    Rh  Positive  23 1154    Antibody Screen  Negative  23 1154       Negative  23 0903    Varicella IgG  276 index 23 0903    Rubella  1.80 index 23 1154       1.73 index 23 0903      ^ Positive  22     Hgb  6.9 g/dL 23 0712       8.9 g/dL 23 1154       9.3 g/dL 23 0903    Hct  24.3 % 23 0712       30.1 % 23 1154       30.1 % 23 0903    Glucose Fasting GTT       Glucose Tolerance Test 1 hour       Glucose Tolerance Test 3 hour       Gonorrhea (discrete)       Chlamydia (discrete)        RPR  Non-Reactive  04/04/23 1154       Non Reactive  04/04/23 0903    VDRL       Syphilis Antibody ^ NONREACTIVE  09/20/22     HBsAg  Non-Reactive  04/04/23 1154       Negative  04/04/23 0903      ^ Negative  09/20/22     Herpes Simplex Virus PCR       Herpes Simplex VIrus Culture       HIV  Non-Reactive  04/04/23 1154       Non Reactive  04/04/23 0903    Hep C RNA Quant PCR       Hep C Antibody  Non-Reactive  04/04/23 1154       Non Reactive  04/04/23 0903    AFP       Group B Strep  Negative  04/04/23 0915    GBS Susceptibility to Clindamycin       GBS Susceptibility to Erythromycin       Fetal Fibronectin       Genetic Testing, Maternal Blood             Drug Screening     Test Value Date Time    Urine Drug Screen       Amphetamine Screen  Negative  04/04/23 1119    Barbiturate Screen  Negative  04/04/23 1119    Benzodiazepine Screen  Negative  04/04/23 1119    Methadone Screen  Negative  04/04/23 1119    Phencyclidine Screen  Negative  04/04/23 1119    Opiates Screen  Negative  04/04/23 1119    THC Screen  Negative  04/04/23 1119    Cocaine Screen       Propoxyphene Screen  Negative  04/04/23 1119    Buprenorphine Screen  Negative  04/04/23 1119    Methamphetamine Screen       Oxycodone Screen  Negative  04/04/23 1119    Tricyclic Antidepressants Screen  Negative  04/04/23 1119          Legend    ^: Historical                        Discharge Disposition Home or Self Care   Condition on Discharge: good   Follow-up: 2-3 weeks with Dr. Ramey or RONDA Babin       Plan for discharge reviewed with Dr. Dukes.    This note has been signed electronically.    Ella Babin, MAYA, APRN, CNM, RNC-OB  4/7/2023        Electronically signed by Martin Dukes MD at 04/07/23 0841       Discharge Order (From admission, onward)     Start     Ordered    04/07/23 0752  Discharge patient  Once        Expected Discharge Date: 04/07/23    Discharge Disposition: Home or Self Care    Physician of Record for Attribution - Please  select from Treatment Team: ROMMEL STOUT [[id number]]    Review needed by CMO to determine Physician of Record: No       Question Answer Comment   Physician of Record for Attribution - Please select from Treatment Team ROMMEL STOUT    Review needed by CMO to determine Physician of Record No        04/07/23 0753

## 2023-04-07 NOTE — DISCHARGE SUMMARY
[facility]    Ella L Talya, APRN  [address]  [place] [address]  [phone]      Discharge Summary     Gamal Jensen  : 2005  MRN: [id number]  CSN: [id number]    Date of Admission: 2023   Date of Discharge:  2023   Delivering Physician: Ev Barth        Admission Diagnosis: 1. Abnormal obstetric ultrasound scan [O28.3]   Discharge Diagnosis: 1. Pregnancy at 39w1d - delivered       Procedures: 2023  - Vaginal, Spontaneous       Hospital Course  Patient is a 18 y.o.  who at 39w1d had a vaginal birth.  Her postpartum course was without complications.  On PPD #2 she was ready for discharge.  She had normal lochia and pain well controlled with oral medications.    Infant  male  fetus weighing 3260 g (7 lb 3 oz)   Apgars -  8 @ 1 minute /  9 @ 5 minutes.    Discharge labs  Lab Results   Component Value Date    WBC 15.62 (H) 2023    HGB 6.9 (C) 2023    HCT 24.3 (L) 2023     2023       Discharge Medications     Discharge Medications      New Medications      Instructions Start Date   ferrous sulfate 325 (65 FE) MG tablet   325 mg, Oral, Daily With Breakfast         Continue These Medications      Instructions Start Date   prenatal (CLASSIC) vitamin  tablet  Generic drug: prenatal vitamin   Oral, Daily             External Prenatal Results     Pregnancy Outside Results - Transcribed From Office Records - See Scanned Records For Details     Test Value Date Time    ABO  A  23 1154    Rh  Positive  23 1154    Antibody Screen  Negative  23 1154       Negative  23 0903    Varicella IgG  276 index 23 0903    Rubella  1.80 index 23 1154       1.73 index 23 0903      ^ Positive  22     Hgb  6.9 g/dL 23 0712       8.9 g/dL 23 1154       9.3 g/dL 23 0903    Hct  24.3 % 23 0712       30.1 % 23 1154       30.1 % 23 0903    Glucose Fasting  GTT       Glucose Tolerance Test 1 hour       Glucose Tolerance Test 3 hour       Gonorrhea (discrete)       Chlamydia (discrete)       RPR  Non-Reactive  04/04/23 1154       Non Reactive  04/04/23 0903    VDRL       Syphilis Antibody ^ NONREACTIVE  09/20/22     HBsAg  Non-Reactive  04/04/23 1154       Negative  04/04/23 0903      ^ Negative  09/20/22     Herpes Simplex Virus PCR       Herpes Simplex VIrus Culture       HIV  Non-Reactive  04/04/23 1154       Non Reactive  04/04/23 0903    Hep C RNA Quant PCR       Hep C Antibody  Non-Reactive  04/04/23 1154       Non Reactive  04/04/23 0903    AFP       Group B Strep  Negative  04/04/23 0915    GBS Susceptibility to Clindamycin       GBS Susceptibility to Erythromycin       Fetal Fibronectin       Genetic Testing, Maternal Blood             Drug Screening     Test Value Date Time    Urine Drug Screen       Amphetamine Screen  Negative  04/04/23 1119    Barbiturate Screen  Negative  04/04/23 1119    Benzodiazepine Screen  Negative  04/04/23 1119    Methadone Screen  Negative  04/04/23 1119    Phencyclidine Screen  Negative  04/04/23 1119    Opiates Screen  Negative  04/04/23 1119    THC Screen  Negative  04/04/23 1119    Cocaine Screen       Propoxyphene Screen  Negative  04/04/23 1119    Buprenorphine Screen  Negative  04/04/23 1119    Methamphetamine Screen       Oxycodone Screen  Negative  04/04/23 1119    Tricyclic Antidepressants Screen  Negative  04/04/23 1119          Legend    ^: Historical                        Discharge Disposition Home or Self Care   Condition on Discharge: good   Follow-up: 2-3 weeks with Dr. Ramey or RONDA Babin       Plan for discharge reviewed with Dr. Dukes.    This note has been signed electronically.    Ella Babin, DNP, APRN, CNM, RNC-OB  4/7/2023

## 2023-04-08 LAB
C TRACH RRNA SPEC QL NAA+PROBE: NEGATIVE
N GONORRHOEA RRNA SPEC QL NAA+PROBE: NEGATIVE

## 2023-04-10 LAB
BACTERIA UR CULT: NORMAL
BACTERIA UR CULT: NORMAL
DRUGS UR: NORMAL

## 2023-04-12 LAB
CYTO UR: NORMAL
LAB AP CASE REPORT: NORMAL
Lab: NORMAL
PATH REPORT.FINAL DX SPEC: NORMAL
PATH REPORT.GROSS SPEC: NORMAL

## 2023-04-26 ENCOUNTER — POSTPARTUM VISIT (OUTPATIENT)
Dept: OBSTETRICS AND GYNECOLOGY | Facility: CLINIC | Age: 18
End: 2023-04-26
Payer: MEDICAID

## 2023-04-26 VITALS
SYSTOLIC BLOOD PRESSURE: 118 MMHG | DIASTOLIC BLOOD PRESSURE: 84 MMHG | BODY MASS INDEX: 30.98 KG/M2 | WEIGHT: 192.8 LBS | HEIGHT: 66 IN

## 2023-04-26 NOTE — PROGRESS NOTES
"      Efrain Ramey MD  [facility]  [address]  [place] [address]  Office [phone]  Fax [phone]       Gamal Jensen  : 2005  MRN: [id number]    Subjective   Subjective     Chief Complaint   Patient presents with   • Postpartum Care     Patient is here for postpartum visit after vaginal delivery 2023.  She reports some pain at episiotomy site.  She states her bleeding has began to lessen some.  She is not breastfeeding at this time.       History of Present Illness  Postpartum Visit  Patient is here for a postpartum visit. She is 2 weeks postpartum following a spontaneous vaginal delivery. Baby is feeding by bottle/formula. Bleeding thin lochia. Bowel function is normal. Bladder function is normal. Patient is not sexually active. Contraception method is abstinence. Postpartum depression screening: negative.    Review of Systems   Constitutional: Negative for activity change, appetite change, chills, fatigue and fever.   Respiratory: Negative for cough and shortness of breath.    Cardiovascular: Negative for chest pain and leg swelling.   Gastrointestinal: Negative for abdominal pain, constipation, diarrhea, nausea and vomiting.   Genitourinary: Positive for vaginal bleeding and vaginal pain (lessening, site of stitches). Negative for decreased urine volume, dysuria, frequency, menstrual problem, pelvic pain, urgency and vaginal discharge.   Psychiatric/Behavioral: Negative for decreased concentration, dysphoric mood, self-injury, sleep disturbance and suicidal ideas. The patient is not nervous/anxious.    All other systems reviewed and are negative.         Objective    Objective     Vitals:   Visit Vitals  /84   Ht 167.6 cm (66\")   Wt 87.5 kg (192 lb 12.8 oz)   Breastfeeding No   BMI 31.12 kg/m²        Physical Exam  Vitals and nursing note reviewed.   Constitutional:       General: She is not in acute distress.     Appearance: Normal appearance. She is not " ill-appearing.   HENT:      Head: Normocephalic and atraumatic.      Nose: No congestion or rhinorrhea.   Eyes:      General: No scleral icterus.        Right eye: No discharge.         Left eye: No discharge.      Extraocular Movements: Extraocular movements intact.      Conjunctiva/sclera: Conjunctivae normal.   Pulmonary:      Effort: Pulmonary effort is normal. No accessory muscle usage or respiratory distress.   Abdominal:      General: Abdomen is flat.      Palpations: Abdomen is soft.      Tenderness: There is no abdominal tenderness.   Musculoskeletal:      Right lower leg: No edema.      Left lower leg: No edema.   Skin:     General: Skin is warm and dry.      Coloration: Skin is not ashen, cyanotic or jaundiced.   Neurological:      General: No focal deficit present.      Mental Status: She is alert and oriented to person, place, and time.      Deep Tendon Reflexes: Reflexes normal.   Psychiatric:         Mood and Affect: Mood normal.         Behavior: Behavior is cooperative.           Result Review    Tissue Pathology Exam (04/05/2023 06:19)  Placenta, delivery:  Third trimester mayen placenta (429 g).  Three-vessel umbilical cord.  No evidence of acute chorioamnionitis or acute funisitis.        Assessment & Plan   Assessment / Plan     Diagnoses and all orders for this visit:    1. Postpartum follow-up (Primary)    meeting postpartum milestones.   Contraceptive options discussed. Handout provided. She will consider her options.   RTC 4 weeks      Return in about 4 weeks (around 5/24/2023) for postpartum.      Efrain Ramey MD

## 2025-01-19 ENCOUNTER — APPOINTMENT (OUTPATIENT)
Dept: GENERAL RADIOLOGY | Facility: HOSPITAL | Age: 20
End: 2025-01-19
Payer: MEDICAID

## 2025-01-19 ENCOUNTER — HOSPITAL ENCOUNTER (EMERGENCY)
Facility: HOSPITAL | Age: 20
Discharge: HOME | End: 2025-01-19
Attending: EMERGENCY MEDICINE | Admitting: EMERGENCY MEDICINE
Payer: MEDICAID

## 2025-01-19 VITALS
TEMPERATURE: 98.3 F | RESPIRATION RATE: 18 BRPM | BODY MASS INDEX: 30.22 KG/M2 | HEIGHT: 66 IN | DIASTOLIC BLOOD PRESSURE: 83 MMHG | HEART RATE: 79 BPM | WEIGHT: 188 LBS | OXYGEN SATURATION: 99 % | SYSTOLIC BLOOD PRESSURE: 149 MMHG

## 2025-01-19 DIAGNOSIS — S63.641A SPRAIN OF METACARPOPHALANGEAL (MCP) JOINT OF RIGHT THUMB, INITIAL ENCOUNTER: Primary | ICD-10-CM

## 2025-01-19 PROCEDURE — 99283 EMERGENCY DEPT VISIT LOW MDM: CPT

## 2025-01-19 PROCEDURE — 73130 X-RAY EXAM OF HAND: CPT

## 2025-01-19 NOTE — ED PROVIDER NOTES
Subjective   History of Present Illness  19-year-old female presents to the ED with complaint of right thumb pain.  Patient states she was attempting to stand from a seated position last night, lost her balance fell forward catching herself with her right hand.  Had immediate pain to the base of the right thumb.  She is right-hand dominant.  Has applied some topical analgesics, has not taken any oral analgesics.  She states the pain and swelling have increased yesterday, she also complains of decreased range of motion due to pain.  No deformity.  No abrasions or lacerations sustained during fall.  She rates her pain as moderate severity, worse with palpation, worse with activity passive range of motion.    History provided by:  Patient      Review of Systems   All other systems reviewed and are negative.      No past medical history on file.    Allergies   Allergen Reactions    Tuna Oil Unknown - High Severity     TUNA       Past Surgical History:   Procedure Laterality Date    TONSILLECTOMY         Family History   Problem Relation Age of Onset    Ovarian cysts Mother        Social History     Socioeconomic History    Marital status: Single   Tobacco Use    Smoking status: Former     Types: Cigarettes    Smokeless tobacco: Never    Tobacco comments:     Stopped when she found out she wad pregnant   Vaping Use    Vaping status: Never Used   Substance and Sexual Activity    Alcohol use: Never    Drug use: Never    Sexual activity: Yes           Objective   Physical Exam  Vitals and nursing note reviewed.   Constitutional:       Appearance: Normal appearance. She is normal weight.   HENT:      Head: Normocephalic and atraumatic.   Cardiovascular:      Rate and Rhythm: Normal rate and regular rhythm.      Heart sounds: Normal heart sounds. No murmur heard.  Pulmonary:      Effort: Pulmonary effort is normal.      Breath sounds: Normal breath sounds. No wheezing, rhonchi or rales.   Musculoskeletal:         General:  Swelling, tenderness and signs of injury present. No deformity.      Comments: Right hand-right thumb swollen around the base of the thumb.  Tender to palpation over the proximal phalanx and first MCP joint.  No snuffbox tenderness.    Sensation intact to light touch to the right hand.  Patient has full range of motion of her fingers   Skin:     General: Skin is warm and dry.      Capillary Refill: Capillary refill takes less than 2 seconds.   Neurological:      Mental Status: She is alert.      Sensory: No sensory deficit.      Motor: No weakness.         Procedures       XR Hand 3+ View Right    Result Date: 1/19/2025  EXAMINATION: XR HAND 3+ VW RIGHT- 1/19/2025 10:35 AM  HISTORY: right hand/thumb pain.  REPORT: 3 views of the right hand were obtained.  COMPARISON: There are no correlative imaging studies for comparison.  Osseous alignment is normal, no fracture is identified. The joint spaces are preserved. The soft tissues appear within normal limits.      No acute osseous abnormality.  This report was signed and finalized on 1/19/2025 10:35 AM by Dr. Donte Fu MD.        ED Course  ED Course as of 01/19/25 1047   Sun Jan 19, 2025   1045 19-year-old female with right thumb pain/injury.  No tenderness over the anatomic snuffbox.  X-ray obtained, negative for fracture.  Likely thumb sprain.  Possible occult fracture, will place in a thumb spica splint and have patient follow-up with orthopedic hand surgery as an outpatient. [AW]      ED Course User Index  [AW] John Doherty MD                                                       Medical Decision Making      Final diagnoses:   Sprain of metacarpophalangeal (MCP) joint of right thumb, initial encounter       ED Disposition  ED Disposition       ED Disposition   Discharge    Condition   Stable    Comment   --               Terence Flaherty MD  [address] [place] [id number]  [phone]    Schedule an appointment as soon as possible for a visit  in 5 days           Medication List      No changes were made to your prescriptions during this visit.            John Doherty MD  01/19/25 5245

## 2025-01-23 ENCOUNTER — TELEPHONE (OUTPATIENT)
Dept: FAMILY MEDICINE CLINIC | Facility: CLINIC | Age: 20
End: 2025-01-23
Payer: MEDICAID